# Patient Record
Sex: MALE | Race: WHITE | NOT HISPANIC OR LATINO | Employment: FULL TIME | URBAN - METROPOLITAN AREA
[De-identification: names, ages, dates, MRNs, and addresses within clinical notes are randomized per-mention and may not be internally consistent; named-entity substitution may affect disease eponyms.]

---

## 2019-07-10 ENCOUNTER — OFFICE VISIT (OUTPATIENT)
Dept: URGENT CARE | Facility: CLINIC | Age: 39
End: 2019-07-10
Payer: COMMERCIAL

## 2019-07-10 VITALS
SYSTOLIC BLOOD PRESSURE: 130 MMHG | WEIGHT: 180 LBS | DIASTOLIC BLOOD PRESSURE: 77 MMHG | BODY MASS INDEX: 25.77 KG/M2 | HEIGHT: 70 IN | HEART RATE: 89 BPM

## 2019-07-10 DIAGNOSIS — M25.462 SWELLING OF KNEE JOINT, LEFT: ICD-10-CM

## 2019-07-10 DIAGNOSIS — L03.116 CELLULITIS OF LEFT KNEE: Primary | ICD-10-CM

## 2019-07-10 PROCEDURE — 3008F PR BODY MASS INDEX (BMI) DOCUMENTED: ICD-10-PCS | Mod: CPTII,S$GLB,, | Performed by: NURSE PRACTITIONER

## 2019-07-10 PROCEDURE — 73564 X-RAY EXAM KNEE 4 OR MORE: CPT | Mod: LT,S$GLB,, | Performed by: NURSE PRACTITIONER

## 2019-07-10 PROCEDURE — 99204 PR OFFICE/OUTPT VISIT, NEW, LEVL IV, 45-59 MIN: ICD-10-PCS | Mod: 25,S$GLB,, | Performed by: NURSE PRACTITIONER

## 2019-07-10 PROCEDURE — 99204 OFFICE O/P NEW MOD 45 MIN: CPT | Mod: 25,S$GLB,, | Performed by: NURSE PRACTITIONER

## 2019-07-10 PROCEDURE — 3008F BODY MASS INDEX DOCD: CPT | Mod: CPTII,S$GLB,, | Performed by: NURSE PRACTITIONER

## 2019-07-10 PROCEDURE — 73564 PR  X-RAY KNEE 4+ VIEW: ICD-10-PCS | Mod: LT,S$GLB,, | Performed by: NURSE PRACTITIONER

## 2019-07-10 RX ORDER — DICLOFENAC SODIUM 50 MG/1
50 TABLET, DELAYED RELEASE ORAL 3 TIMES DAILY PRN
Qty: 30 TABLET | Refills: 0 | Status: SHIPPED | OUTPATIENT
Start: 2019-07-10 | End: 2020-07-08 | Stop reason: ALTCHOICE

## 2019-07-10 RX ORDER — DEXTROAMPHETAMINE SACCHARATE, AMPHETAMINE ASPARTATE, DEXTROAMPHETAMINE SULFATE AND AMPHETAMINE SULFATE 7.5; 7.5; 7.5; 7.5 MG/1; MG/1; MG/1; MG/1
30 TABLET ORAL 2 TIMES DAILY
COMMUNITY

## 2019-07-10 RX ORDER — SULFAMETHOXAZOLE AND TRIMETHOPRIM 800; 160 MG/1; MG/1
2 TABLET ORAL 2 TIMES DAILY
Qty: 28 TABLET | Refills: 0 | Status: SHIPPED | OUTPATIENT
Start: 2019-07-10 | End: 2019-07-17

## 2019-07-11 NOTE — PROGRESS NOTES
"Subjective:       Patient ID: Flakito Soriano is a 38 y.o. male.    Vitals:  height is 5' 10" (1.778 m) and weight is 81.6 kg (180 lb). His blood pressure is 130/77 and his pulse is 89.     Chief Complaint: Joint Swelling (left knee x 3 days)    Knee Pain    The incident occurred 3 to 5 days ago. The pain is present in the left knee. The pain is at a severity of 7/10. Associated symptoms include an inability to bear weight. The symptoms are aggravated by weight bearing. He has tried non-weight bearing for the symptoms. The treatment provided no relief.       Constitution: Negative for chills, fatigue and fever.   HENT: Negative for congestion and sore throat.    Neck: Negative for painful lymph nodes.   Cardiovascular: Negative for chest pain and leg swelling.   Eyes: Negative for double vision and blurred vision.   Respiratory: Negative for cough and shortness of breath.    Gastrointestinal: Negative for nausea, vomiting and diarrhea.   Genitourinary: Negative for dysuria, frequency and urgency.   Musculoskeletal: Positive for pain, joint pain, joint swelling and pain with walking. Negative for trauma, muscle cramps and muscle ache.   Skin: Positive for erythema. Negative for color change, pale and rash.   Allergic/Immunologic: Negative for seasonal allergies.   Neurological: Negative for dizziness, history of vertigo, light-headedness, passing out and headaches.   Hematologic/Lymphatic: Negative for swollen lymph nodes, easy bruising/bleeding and history of blood clots. Does not bruise/bleed easily.   Psychiatric/Behavioral: Negative for nervous/anxious, sleep disturbance and depression. The patient is not nervous/anxious.        Objective:      Physical Exam   Constitutional: He is oriented to person, place, and time. He appears well-developed and well-nourished. He is cooperative.  Non-toxic appearance. He does not appear ill. No distress.   HENT:   Head: Normocephalic and atraumatic.   Right Ear: Hearing, " tympanic membrane, external ear and ear canal normal.   Left Ear: Hearing, tympanic membrane, external ear and ear canal normal.   Nose: Nose normal. No mucosal edema, rhinorrhea or nasal deformity. No epistaxis. Right sinus exhibits no maxillary sinus tenderness and no frontal sinus tenderness. Left sinus exhibits no maxillary sinus tenderness and no frontal sinus tenderness.   Mouth/Throat: Uvula is midline, oropharynx is clear and moist and mucous membranes are normal. No trismus in the jaw. Normal dentition. No uvula swelling. No posterior oropharyngeal erythema.   Eyes: Conjunctivae and lids are normal. Right eye exhibits no discharge. Left eye exhibits no discharge. No scleral icterus.   Sclera clear bilat   Neck: Trachea normal, normal range of motion, full passive range of motion without pain and phonation normal. Neck supple.   Cardiovascular: Normal rate, regular rhythm, normal heart sounds, intact distal pulses and normal pulses.   Pulmonary/Chest: Effort normal and breath sounds normal. No respiratory distress.   Abdominal: Soft. Normal appearance and bowel sounds are normal. He exhibits no distension, no pulsatile midline mass and no mass. There is no tenderness.   Musculoskeletal: He exhibits no edema or deformity.        Left knee: He exhibits decreased range of motion, swelling, effusion and erythema. He exhibits no ecchymosis, no deformity, no laceration, normal alignment, no LCL laxity, normal patellar mobility, no bony tenderness, normal meniscus and no MCL laxity. Tenderness found. Patellar tendon tenderness noted. No medial joint line, no lateral joint line, no MCL and no LCL tenderness noted.        Legs:  Neurological: He is alert and oriented to person, place, and time. He exhibits normal muscle tone. Coordination normal.   Skin: Skin is warm, dry and intact. He is not diaphoretic. There is erythema. No pallor.   Psychiatric: He has a normal mood and affect. His speech is normal and behavior  is normal. Judgment and thought content normal. Cognition and memory are normal.   Nursing note and vitals reviewed.      Assessment:       1. Cellulitis of left knee    2. Swelling of knee joint, left        Plan:         Cellulitis of left knee    Swelling of knee joint, left  -     XR KNEE 3 VIEW LEFT; Future; Expected date: 07/10/2019    Other orders  -     sulfamethoxazole-trimethoprim 800-160mg (BACTRIM DS) 800-160 mg Tab; Take 2 tablets by mouth 2 (two) times daily. for 7 days  Dispense: 28 tablet; Refill: 0  -     diclofenac (VOLTAREN) 50 MG EC tablet; Take 1 tablet (50 mg total) by mouth 3 (three) times daily as needed.  Dispense: 30 tablet; Refill: 0

## 2019-07-11 NOTE — PATIENT INSTRUCTIONS
Cellulitis (Child)  Cellulitis is an infection of the deep layers of skin. A break in the skin, such as a cut or scratch, can let bacteria under the skin. If the bacteria get to deep layers of the skin, it can case a serious infection. If not treated, cellulitis can get into the bloodstream and lymph nodes. The infection can then spread throughout the body.  In children, cellulitis occurs most often on the legs and feet. It is more common in children with a weakened immune system. Cellulitis causes the affected skin to become red, swollen, warm, and sore. The reddened areas have a visible border. Your child may have a fever, chills, and pain. A young child may be fussy and cry and be hard to soothe.  Cellulitis is treated with antibiotics. Symptoms should get better 1 to 2 days after treatment is started. In some cases, symptoms can come back.  Home care  You will be given an antibiotic to treat the infection. Make sure to give all the medicine for the full number of days until it is gone. Keep giving the medicine even if your child has no symptoms. You may also be advised to use medicine to reduce fever and swelling. Follow the healthcare providers instructions for giving these medicines to your child.  General care  · Have your child rest as much as possible until the infection starts to get better.  · If possible, have your child sit or lie down with the affected area raised above the level of his or her heart. This can help reduce swelling.  · Follow the healthcare providers instructions to care for an open wound and change any dressings.  · Keep your childs fingernails short to reduce scratching.  · Wash your hands with soap and warm water before and after caring for your child. This is to prevent spreading the infection.  Follow-up care  Follow up with your childs healthcare provider.  When to seek medical advice  Call your child's healthcare provider right away if any of these occur:  · Fever of 100.4º  F (38.0º C) or higher orally, or over 101.4º F (38.6 C) rectally, after 2 days on antibiotics  · Symptoms that dont get better with treatment  · Swollen lymph nodes on the neck or under the arm  · Swelling around the eyes or behind the ears  · Excessive drooling, neck swelling, or muffled voice  · Redness or swelling that gets worse  · Pain that gets worse  · Foul-smelling fluid coming from the affected area  · Blackened skin  Date Last Reviewed: 1/1/2017  © 9225-3028 [employer]. [address] [place] [address]. All rights reserved. This information is not intended as a substitute for professional medical care. Always follow your healthcare professional's instructions.

## 2020-06-28 NOTE — PROGRESS NOTES
"Subjective:      Flakito Barajas is a 39 y.o. male who was self-referred for evaluation of prostatitis.      Mr. Barajas presents to discuss recent diagnosis of prostatitis.  He presented to ED on 6/21 with low back pain that radiated to bilateral hips. He reports onset was gradual in nature but exacerbated by golfing earlier that day. He was treated with morphine and sent home. He had follow up appt with PCP. Based on symptoms he was started on 15 day course of sullfamethoxazole-trimethoprim 800-160mg (BACTRIM DS) 800-160 mg Tab; instructed to follow up with Urology. AUASS score today is 15/3.  He reports lower back pain that radiates to hips, bilaterally, difficulty sitting in certain positions ("feels like he is sitting on something"), dysuria, urgency and frequency. He also reports onset of weak stream this past week. Reports fever for 3 days last week. The highest was 103. He currently denies chills, nausea, vomiting. Reports some loss of appetite last week. Since starting Bactrim he has experienced some mild relief of symptoms. He reports that back pain has gotten a little better. No longer experiencing fever.    No personal or family history of prostate cancer. No previous history of  surgeries. No past history of UTI. 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 Xrays were conducted in ED. See below for full report.      The following portions of the patient's history were reviewed and updated as appropriate: allergies, current medications, past family history, past medical history, past social history, past surgical history and problem list.    Review of Systems  Constitutional: no fever or chills  ENT: no nasal congestion or sore throat  Respiratory: no cough or shortness of breath  Cardiovascular: no chest pain or palpitations  Gastrointestinal: no nausea or vomiting, tolerating diet  Genitourinary: as per HPI  Hematologic/Lymphatic: no easy bruising or lymphadenopathy  Musculoskeletal: " "no arthralgias or myalgias  Neurological: no seizures or tremors  Behavioral/Psych: no auditory or visual hallucinations     Objective:   Vitals: BP (!) 145/87 (BP Location: Left arm, Patient Position: Sitting, BP Method: Medium (Automatic))   Pulse (!) 130   Ht 5' 10" (1.778 m)   Wt 88.3 kg (194 lb 8.9 oz)   BMI 27.92 kg/m²     Physical Exam   General: alert and oriented, no acute distress  Head: normocephalic, atraumatic  Neck: supple, no lymphadenopathy, normal ROM, no masses  Respiratory: Symmetric expansion, non-labored breathing  Cardiovascular: regular rate and rhythm, nomal pulses, no peripheral edema  Abdomen: soft, non tender, non distended, no palpable masses, no hernias, no hepatomegaly or splenomegaly  Genitourinary: defer due to suspected prostatitis   Lymphatic: no inguinal nodes  Skin: normal coloration and turgor, no rashes, no suspicious skin lesions noted  Neuro: alert and oriented x3, no gross deficits  Psych: normal judgment and insight, normal mood/affect and non-anxious    Physical Exam    Lab Review   Urinalysis demonstrates positive for leukocytes, red blood cells, protein, urobilinogen  No results found for: WBC, HGB, HCT, MCV, PLT  No results found for: CREATININE, BUN  No results found for: PSA  Imaging  Pain, unspecified     TECHNIQUE:  Two or three views of the sacrum and coccyx were performed.     COMPARISON:  None     FINDINGS:  No acute fracture of the sacrum or coccyx.     Grade 1 spondylolisthesis of L5 on S1 is incidentally noted with mild disc space narrowing.     No osseous destruction.  No mass is detected.     Impression:     1. No acute radiographic abnormality.  2. Grade 1 spondylolisthesis of L5 on S1.        Electronically signed by: Isidoro Sosa  Date:                                            06/21/2020  Time:                                           17:42    Bladder Scan PVR: 15cc     Assessment:     1. Dysuria    2. Acute prostatitis        Plan:     Orders " Placed This Encounter    Urine culture    POCT URINE DIPSTICK WITHOUT MICROSCOPE    POCT Bladder Scan     --In agreement with dx of prostatitis; pt instructed to continue antibiotics are directed  --Will send urine today; notify of results by phone, adjust antibiotics as needed  --Pt is aware that notify office of worsening symptoms while on antibiotics such as fever; will need to r/o prostate abscess with CT.   --RTC in 2 weeks for symptoms assessment

## 2020-06-30 ENCOUNTER — OFFICE VISIT (OUTPATIENT)
Dept: UROLOGY | Facility: CLINIC | Age: 40
End: 2020-06-30
Payer: COMMERCIAL

## 2020-06-30 VITALS
SYSTOLIC BLOOD PRESSURE: 145 MMHG | WEIGHT: 194.56 LBS | HEIGHT: 70 IN | BODY MASS INDEX: 27.85 KG/M2 | DIASTOLIC BLOOD PRESSURE: 87 MMHG | HEART RATE: 130 BPM

## 2020-06-30 DIAGNOSIS — R30.0 DYSURIA: Primary | ICD-10-CM

## 2020-06-30 DIAGNOSIS — N41.0 ACUTE PROSTATITIS: ICD-10-CM

## 2020-06-30 LAB
BILIRUB SERPL-MCNC: ABNORMAL MG/DL
BLOOD URINE, POC: 50
CLARITY, POC UA: ABNORMAL
COLOR, POC UA: YELLOW
GLUCOSE UR QL STRIP: NORMAL
KETONES UR QL STRIP: NEGATIVE
LEUKOCYTE ESTERASE URINE, POC: ABNORMAL
NITRITE, POC UA: NEGATIVE
PH, POC UA: 5
POC RESIDUAL URINE VOLUME: 15 ML (ref 0–100)
PROTEIN, POC: ABNORMAL
SPECIFIC GRAVITY, POC UA: 1.02
UROBILINOGEN, POC UA: 8

## 2020-06-30 PROCEDURE — 87086 URINE CULTURE/COLONY COUNT: CPT

## 2020-06-30 PROCEDURE — 3008F PR BODY MASS INDEX (BMI) DOCUMENTED: ICD-10-PCS | Mod: CPTII,S$GLB,, | Performed by: NURSE PRACTITIONER

## 2020-06-30 PROCEDURE — 81002 URINALYSIS NONAUTO W/O SCOPE: CPT | Mod: S$GLB,,, | Performed by: NURSE PRACTITIONER

## 2020-06-30 PROCEDURE — 99203 OFFICE O/P NEW LOW 30 MIN: CPT | Mod: 25,S$GLB,, | Performed by: NURSE PRACTITIONER

## 2020-06-30 PROCEDURE — 51798 POCT BLADDER SCAN: ICD-10-PCS | Mod: S$GLB,,, | Performed by: NURSE PRACTITIONER

## 2020-06-30 PROCEDURE — 99999 PR PBB SHADOW E&M-EST. PATIENT-LVL III: CPT | Mod: PBBFAC,,, | Performed by: NURSE PRACTITIONER

## 2020-06-30 PROCEDURE — 81002 POCT URINE DIPSTICK WITHOUT MICROSCOPE: ICD-10-PCS | Mod: S$GLB,,, | Performed by: NURSE PRACTITIONER

## 2020-06-30 PROCEDURE — 99999 PR PBB SHADOW E&M-EST. PATIENT-LVL III: ICD-10-PCS | Mod: PBBFAC,,, | Performed by: NURSE PRACTITIONER

## 2020-06-30 PROCEDURE — 51798 US URINE CAPACITY MEASURE: CPT | Mod: S$GLB,,, | Performed by: NURSE PRACTITIONER

## 2020-06-30 PROCEDURE — 99203 PR OFFICE/OUTPT VISIT, NEW, LEVL III, 30-44 MIN: ICD-10-PCS | Mod: 25,S$GLB,, | Performed by: NURSE PRACTITIONER

## 2020-06-30 PROCEDURE — 3008F BODY MASS INDEX DOCD: CPT | Mod: CPTII,S$GLB,, | Performed by: NURSE PRACTITIONER

## 2020-06-30 RX ORDER — SULFAMETHOXAZOLE AND TRIMETHOPRIM 800; 160 MG/1; MG/1
1 TABLET ORAL 2 TIMES DAILY
Status: ON HOLD | COMMUNITY
End: 2020-07-20 | Stop reason: HOSPADM

## 2020-06-30 NOTE — PATIENT INSTRUCTIONS
Bacterial Prostatitis  The prostate gland is part of the male reproductive system. The gland sits just below the bladder. It surrounds the urethra, the tube that carries urine and semen out of the body. Bacterial prostatitis is an infection of the prostate. It causes the prostate to become painful and swollen. The problem often comes on suddenly, and can make you very sick. But it can be treated.     With a healthy prostate, urine flows easily through the urethra.       With a swollen prostate, the urethra narrows. Its harder for urine to go through.      Causes and symptoms  Bacterial prostatitis is due to infection with bacteria (germs). This infection makes the prostate swell. This squeezes the urethra, narrowing or blocking it. Symptoms may be severe. They can include:  · Fever and chills  · Low back pain  · Having to urinate often  · Pain with urination  · A less forceful urine stream  · Need to strain to urinate  · Inability to urinate  Treatment  The infection is treated with antibiotics. Take all of the medicine until it's gone, even if you start to feel better. If you dont, the infection may come back and be harder to treat. Your healthcare provider may also suggest other care, such as resting and drinking more fluid. Closely follow any instructions you are given.  Chronic bacterial prostatitis  Prostatitis can turn into a chronic (ongoing) problem. In this case, the prostate stays swollen and inflamed despite treatment with antibiotics. One possible cause is repeated infections. Symptoms include pain and burning with urination and needing to urinate often. It may also cause lower abdomen or back pain. If you have this problem, your healthcare provider will discuss a treatment plan with you. Treatment usually consists of a 6 to 12 week course of antibiotics.   Date Last Reviewed: 1/1/2017  © 4423-6950 [employer]. [address] [place] [address], [place] [address]. All rights reserved. This  information is not intended as a substitute for professional medical care. Always follow your healthcare professional's instructions.

## 2020-07-02 LAB — BACTERIA UR CULT: NO GROWTH

## 2020-07-05 ENCOUNTER — PATIENT MESSAGE (OUTPATIENT)
Dept: UROLOGY | Facility: CLINIC | Age: 40
End: 2020-07-05

## 2020-07-07 ENCOUNTER — TELEPHONE (OUTPATIENT)
Dept: UROLOGY | Facility: CLINIC | Age: 40
End: 2020-07-07

## 2020-07-07 DIAGNOSIS — R10.30 LOWER ABDOMINAL PAIN: ICD-10-CM

## 2020-07-07 DIAGNOSIS — Z30.09 ENCOUNTER FOR VASECTOMY ASSESSMENT: Primary | ICD-10-CM

## 2020-07-07 NOTE — TELEPHONE ENCOUNTER
----- Message from Lissy Kothari sent at 7/7/2020  3:24 PM CDT -----  Contact: self @ [phone]  Pt is calling for his CT Scan results from today.  Pls call to discuss.

## 2020-07-07 NOTE — TELEPHONE ENCOUNTER
----- Message from Jewels White sent at 7/7/2020  4:16 PM CDT -----  Regarding: Pt  Reason: Pt returning missed call. Please call     Communication: [phone]

## 2020-07-07 NOTE — TELEPHONE ENCOUNTER
I have spoken with pt. All questions answered. See results note for full report of telephone call.

## 2020-07-08 ENCOUNTER — TELEPHONE (OUTPATIENT)
Dept: NEUROSURGERY | Facility: CLINIC | Age: 40
End: 2020-07-08

## 2020-07-08 ENCOUNTER — OFFICE VISIT (OUTPATIENT)
Dept: SPINE | Facility: CLINIC | Age: 40
End: 2020-07-08
Payer: COMMERCIAL

## 2020-07-08 VITALS
HEART RATE: 106 BPM | WEIGHT: 194.69 LBS | BODY MASS INDEX: 27.87 KG/M2 | TEMPERATURE: 98 F | HEIGHT: 70 IN | SYSTOLIC BLOOD PRESSURE: 127 MMHG | DIASTOLIC BLOOD PRESSURE: 83 MMHG

## 2020-07-08 DIAGNOSIS — M43.17 SPONDYLOLISTHESIS AT L5-S1 LEVEL: Primary | ICD-10-CM

## 2020-07-08 DIAGNOSIS — M54.9 DORSALGIA, UNSPECIFIED: ICD-10-CM

## 2020-07-08 DIAGNOSIS — M43.10 PARS DEFECT WITH SPONDYLOLISTHESIS: ICD-10-CM

## 2020-07-08 DIAGNOSIS — M48.061 LUMBAR STENOSIS WITHOUT NEUROGENIC CLAUDICATION: ICD-10-CM

## 2020-07-08 PROCEDURE — 99999 PR PBB SHADOW E&M-EST. PATIENT-LVL IV: CPT | Mod: PBBFAC,,, | Performed by: PHYSICIAN ASSISTANT

## 2020-07-08 PROCEDURE — 99204 PR OFFICE/OUTPT VISIT, NEW, LEVL IV, 45-59 MIN: ICD-10-PCS | Mod: S$GLB,,, | Performed by: PHYSICIAN ASSISTANT

## 2020-07-08 PROCEDURE — 3008F BODY MASS INDEX DOCD: CPT | Mod: CPTII,S$GLB,, | Performed by: PHYSICIAN ASSISTANT

## 2020-07-08 PROCEDURE — 3008F PR BODY MASS INDEX (BMI) DOCUMENTED: ICD-10-PCS | Mod: CPTII,S$GLB,, | Performed by: PHYSICIAN ASSISTANT

## 2020-07-08 PROCEDURE — 99999 PR PBB SHADOW E&M-EST. PATIENT-LVL IV: ICD-10-PCS | Mod: PBBFAC,,, | Performed by: PHYSICIAN ASSISTANT

## 2020-07-08 PROCEDURE — 99204 OFFICE O/P NEW MOD 45 MIN: CPT | Mod: S$GLB,,, | Performed by: PHYSICIAN ASSISTANT

## 2020-07-08 RX ORDER — TIZANIDINE 4 MG/1
4 TABLET ORAL EVERY 8 HOURS PRN
Qty: 30 TABLET | Refills: 0 | Status: ON HOLD | OUTPATIENT
Start: 2020-07-08 | End: 2020-07-20 | Stop reason: HOSPADM

## 2020-07-08 RX ORDER — NAPROXEN 500 MG/1
500 TABLET ORAL 2 TIMES DAILY PRN
Qty: 60 TABLET | Refills: 0 | Status: ON HOLD | OUTPATIENT
Start: 2020-07-08 | End: 2020-07-20 | Stop reason: HOSPADM

## 2020-07-08 NOTE — TELEPHONE ENCOUNTER
----- Message from Jeannette Ware PA-C sent at 7/8/2020  4:03 PM CDT -----  Regarding: Appt  Hey There,    Can you please make this patient an appt early next week with 1st available? MRI should be done this week. He probably needs an L5-S1 fusion as he has a spondy with B pars defect. May even need an ALIF?     Thanks,   K

## 2020-07-08 NOTE — PROGRESS NOTES
[facility]    Patient ID: Flakito Barajas is a 39 y.o. male.    Chief Complaint   Patient presents with    Low-back Pain       Review of Systems   Constitutional: Negative for chills, fatigue and fever.   HENT: Negative for drooling, ear pain, hearing loss and trouble swallowing.    Eyes: Negative for photophobia and visual disturbance.   Respiratory: Negative for chest tightness and shortness of breath.    Cardiovascular: Negative for chest pain and leg swelling.   Gastrointestinal: Negative for abdominal pain, nausea and vomiting.   Endocrine: Negative for cold intolerance and heat intolerance.   Genitourinary: Negative for dysuria, frequency, hematuria and urgency.   Musculoskeletal: Positive for back pain and gait problem. Negative for arthralgias, myalgias, neck pain and neck stiffness.   Skin: Negative for color change and wound.   Allergic/Immunologic: Negative for immunocompromised state.   Neurological: Negative for seizures, syncope, facial asymmetry, speech difficulty, weakness, numbness and headaches.   Psychiatric/Behavioral: Negative for agitation, confusion, hallucinations and sleep disturbance.       No past medical history on file.  No past surgical history on file.  Social History     Socioeconomic History    Marital status:      Spouse name: Not on file    Number of children: Not on file    Years of education: Not on file    Highest education level: Not on file   Occupational History    Not on file   Social Needs    Financial resource strain: Not on file    Food insecurity     Worry: Not on file     Inability: Not on file    Transportation needs     Medical: Not on file     Non-medical: Not on file   Tobacco Use    Smoking status: Never Smoker    Smokeless tobacco: Never Used   Substance and Sexual Activity    Alcohol use: Not on file    Drug use: Not on file    Sexual activity: Not on file   Lifestyle    Physical activity     Days per week: Not on file  "    Minutes per session: Not on file    Stress: Not on file   Relationships    Social connections     Talks on phone: Not on file     Gets together: Not on file     Attends [employer] service: Not on file     Active member of club or organization: Not on file     Attends meetings of clubs or organizations: Not on file     Relationship status: Not on file   Other Topics Concern    Not on file   Social History Narrative    Not on file     No family history on file.  Review of patient's allergies indicates:  No Known Allergies    Current Outpatient Medications:     dextroamphetamine-amphetamine (ADDERALL) 30 mg Tab, Take 30 tablets by mouth., Disp: , Rfl:     lidocaine (LIDODERM) 5 %, Place 1 patch onto the skin once daily. Remove & Discard patch within 12 hours or as directed by MD, Disp: 30 patch, Rfl: 0    naproxen (NAPROSYN) 500 MG tablet, Take 1 tablet (500 mg total) by mouth 2 (two) times daily as needed (pain. take with food)., Disp: 60 tablet, Rfl: 0    sulfamethoxazole-trimethoprim 800-160mg (BACTRIM DS) 800-160 mg Tab, Take 1 tablet by mouth 2 (two) times a day., Disp: , Rfl:     tiZANidine (ZANAFLEX) 4 MG tablet, Take 1 tablet (4 mg total) by mouth every 8 (eight) hours as needed., Disp: 30 tablet, Rfl: 0    Vitals:    07/08/20 1515   BP: 127/83   Pulse: 106   Temp: 98.3 °F (36.8 °C)   Weight: 88.3 kg (194 lb 10.7 oz)   Height: 5' 10" (1.778 m)   PainSc:   5   PainLoc: Back      Estimated body mass index is 27.93 kg/m² as calculated from the following:    Height as of this encounter: 5' 10" (1.778 m).    Weight as of this encounter: 88.3 kg (194 lb 10.7 oz).    Physical Exam  Vitals signs and nursing note reviewed.   Constitutional:       Appearance: Normal appearance. He is well-developed.   HENT:      Head: Normocephalic and atraumatic.      Right Ear: Hearing normal.      Left Ear: Hearing normal.      Nose: Nose normal.      Mouth/Throat:      Pharynx: Uvula midline.   Eyes:      General: Lids " are normal.      Extraocular Movements: EOM normal.      Conjunctiva/sclera: Conjunctivae normal.      Pupils: Pupils are equal, round, and reactive to light.   Neck:      Musculoskeletal: Full passive range of motion without pain, normal range of motion and neck supple.      Trachea: Trachea and phonation normal.   Cardiovascular:      Rate and Rhythm: Normal rate and regular rhythm.      Pulses: Normal pulses.   Pulmonary:      Effort: Pulmonary effort is normal.      Breath sounds: Normal breath sounds.   Abdominal:      Palpations: Abdomen is soft.   Musculoskeletal: Normal range of motion.   Feet:      Right foot:      Protective Sensation: 4 sites tested. 4 sites sensed.      Skin integrity: No ulcer.      Left foot:      Protective Sensation: 4 sites tested. 4 sites sensed.      Skin integrity: No ulcer.   Skin:     General: Skin is warm and dry.      Capillary Refill: Capillary refill takes less than 2 seconds.   Neurological:      Mental Status: He is alert and oriented to person, place, and time.      Cranial Nerves: No cranial nerve deficit.      Sensory: No sensory deficit.      Coordination: Finger-Nose-Finger Test, Heel to Shin Test and Romberg Test normal.      Gait: Gait is intact. Tandem walk normal.      Deep Tendon Reflexes: Strength normal and reflexes are normal and symmetric.      Reflex Scores:       Tricep reflexes are 2+ on the right side and 2+ on the left side.       Bicep reflexes are 2+ on the right side and 2+ on the left side.       Brachioradialis reflexes are 2+ on the right side and 2+ on the left side.       Patellar reflexes are 2+ on the right side and 2+ on the left side.       Achilles reflexes are 2+ on the right side and 2+ on the left side.  Psychiatric:         Speech: Speech normal.         Behavior: Behavior normal.         Thought Content: Thought content normal.         Judgment: Judgment normal.         Neurologic Exam     Mental Status   Oriented to person, place, and  time.   Follows 3 step commands.   Attention: normal. Concentration: normal.   Speech: speech is normal   Level of consciousness: alert  Knowledge: good.   Able to name object.     Cranial Nerves     CN II   Visual fields full to confrontation.   Visual acuity: normal  Right visual field deficit: none  Left visual field deficit: none     CN III, IV, VI   Pupils are equal, round, and reactive to light.  Extraocular motions are normal.   CN III: no CN III palsy  CN VI: no CN VI palsy    CN V   Facial sensation intact.   Right facial sensation deficit: none  Left facial sensation deficit: none    CN VII   Facial expression full, symmetric.   Right facial weakness: none  Left facial weakness: none    CN VIII   CN VIII normal.   Hearing: intact    CN IX, X   CN IX normal.   CN X normal.     CN XI   CN XI normal.     CN XII   CN XII normal.     Motor Exam   Muscle bulk: normal  Overall muscle tone: normal  Right arm tone: normal  Left arm tone: normal  Right arm pronator drift: absent  Left arm pronator drift: absent  Right leg tone: normal  Left leg tone: normal    Strength   Strength 5/5 throughout.   Right neck flexion: 5/5  Left neck flexion: 5/5  Right neck extension: 5/5  Left neck extension: 5/5  Right deltoid: 5/5  Left deltoid: 5/5  Right biceps: 5/5  Left biceps: 5/5  Right triceps: 5/5  Left triceps: 5/5  Right wrist flexion: 5/5  Left wrist flexion: 5/5  Right wrist extension: 5/5  Left wrist extension: 5/5  Right interossei: 5/5  Left interossei: 5/5  Right abdominals: 5/5  Left abdominals: 5/5  Right iliopsoas: 5/5  Left iliopsoas: 5/5  Right quadriceps: 5/5  Left quadriceps: 5/5  Right hamstrin/5  Left hamstrin/5  Right glutei: 5/5  Left glutei: 5/5  Right anterior tibial: 5/5  Left anterior tibial: 5/5  Right posterior tibial: 5/5  Left posterior tibial: 5/5  Right peroneal: 5/5  Left peroneal: 5/5  Right gastroc: 5/5  Left gastroc: 5/5    Sensory Exam   Light touch normal.   Right arm light touch:  normal  Left arm light touch: normal  Right leg light touch: normal  Left leg light touch: normal  Vibration normal.     Gait, Coordination, and Reflexes     Gait  Gait: normal    Coordination   Romberg: negative  Finger to nose coordination: normal  Heel to shin coordination: normal  Tandem walking coordination: normal    Tremor   Resting tremor: absent  Intention tremor: absent  Action tremor: absent    Reflexes   Right brachioradialis: 2+  Left brachioradialis: 2+  Right biceps: 2+  Left biceps: 2+  Right triceps: 2+  Left triceps: 2+  Right patellar: 2+  Left patellar: 2+  Right achilles: 2+  Left achilles: 2+  Right : 2+  Left : 2+  Right plantar: normal  Left plantar: normal  Right Spivey: absent  Left Spivey: absent  Right ankle clonus: absent  Left ankle clonus: absent      CT Renal Stone Study ABD Pelvis WO  Narrative: EXAMINATION:  CT RENAL STONE STUDY ABD PELVIS WO    CLINICAL HISTORY:  Abdominal pain, fever; Lower abdominal pain, unspecified    TECHNIQUE:  Low dose axial images, sagittal and coronal reformations were obtained from the lung bases to the pubic symphysis.  Contrast was not administered.    COMPARISON:  None    FINDINGS:  Images of the lower thorax are remarkable for minimal dependent atelectasis noting motion artifact.  There is trace pericardial fluid.    There is a subcentimeter low attenuating lesion within the anterior aspect of the left hepatic lobe, too small for characterization.  The spleen, pancreas, gallbladder and adrenal glands have a grossly unremarkable noncontrast appearance allowing for motion artifact.  The stomach is distended with ingested content noting small hiatal hernia.  No significant abdominal lymphadenopathy.  The pancreatic duct is not dilated.  No ascites.    The kidneys have a grossly unremarkable noncontrast appearance without hydronephrosis or nephrolithiasis.  The bilateral ureters are unremarkable without calculi seen.  The urinary bladder is  unremarkable.  The prostate is not enlarged.    There are a few scattered colonic diverticula without inflammation.  The terminal ileum and appendix are unremarkable.  The small bowel is unremarkable.  There are several scattered shotty periaortic and paracaval lymph nodes.  No focal organized pelvic fluid collection.  There are minimal bilateral fat containing inguinal hernias.    Degenerative changes are noted of the spine.  There is grade 2 anterolisthesis of L5 on S1 related to bilateral L5 pars defects.  There is edema/induration anterior to the sacrum at the level of S1-S2.  There is a large anterior disc herniation at L5-S1.  Given the inflammation, developing infection at this level is not excluded, correlation with any focal tenderness in the region is recommended, further evaluation with MRI as warranted.  Impression: This report was flagged in Epic as abnormal.    1. No findings to suggest obstructive uropathy.  2. Degenerative changes at L5-S1 noting grade 2 anterolisthesis of L5 on S1 related to pars defects.  There is a large anterior disc protrusion at L5-S1 with associated inflammatory changes primarily anterior to S1 and S2.  Correlation with any focal tenderness in the region is recommended, further evaluation with MRI as warranted noting developing osteomyelitis/discitis is a concern particularly in this patient with provided history of abdominal pain and fever.  3. Please see above for several additional findings.    Electronically signed by: Florencio Kuhn MD  Date:    07/07/2020  Time:    12:23      Visit Diagnosis:  Spondylolisthesis at L5-S1 level  -     Ambulatory referral/consult to Neurosurgery; Future; Expected date: 07/15/2020    Dorsalgia, unspecified  -     MRI Lumbar Spine Without Contrast; Future; Expected date: 07/08/2020  -     X-Ray Lumbar Complete With Flex And Ext; Future; Expected date: 07/08/2020    Lumbar stenosis without neurogenic claudication  -     Ambulatory  referral/consult to Neurosurgery; Future; Expected date: 07/15/2020    Pars defect with spondylolisthesis    Other orders  -     tiZANidine (ZANAFLEX) 4 MG tablet; Take 1 tablet (4 mg total) by mouth every 8 (eight) hours as needed.  Dispense: 30 tablet; Refill: 0  -     naproxen (NAPROSYN) 500 MG tablet; Take 1 tablet (500 mg total) by mouth 2 (two) times daily as needed (pain. take with food).  Dispense: 60 tablet; Refill: 0        Provider dictation:  Oswestry score: 58%  PHQ:  8    The patient is a 39-year-old male with a negative past medical history that presents today for evaluation of severe axial lumbar back pain.  He reports he had to go to the emergency room due to severe pain.  He has had difficulties ambulating for the last several weeks.  He does have a remote history of a severe back injury when he was 8 playing football but he does not remember any other trauma.  Approximately 2-3 weeks ago he was having severe back pain with fever and was worked up for prostatitis however he is finished antibiotics and is no longer running fever.  Pain is in the axial spine radiates across the bilateral hips.  He is having difficulty doing activities of daily living due to severe back pain.  He denies leg numbness tingling or weakness.  He denies bowel or bladder dysfunction.  He has not had any type of intervention previously.    On physical examination he walks with an antalgic gait and slow due to back pain however muscle stretch reflexes and strength is maintained.  No sensory deficit.  EHL is intact bilaterally.    CT of the abdomen was independently reviewed there is a grade 2 spondylolisthesis of L5 on S1 with severe bilateral foraminal stenosis and disc herniation.  There is a bilateral pars defect without cortical bridging.    I am concerned at the spondylolisthesis and pars defects that he may develop progressive neurologic symptoms.  I have recommended an MRI of the lumbar spine evaluation with neuro  surgery.  But he has not tried conservative management his debilitating lumbar back pain is likely not going to improve with physical therapy or injections due to the size of the spondylolisthesis.  I will defer to neurosurgery for evaluation of surgical intervention verses physical therapy versus injections at this time.  The patient is interested in consultation with Neurosurgery.  Follow-up with me p.r.n.        This note was done using voice recognition software. Please excuse any errors missed in proof reading.

## 2020-07-08 NOTE — PROGRESS NOTES
Hello! We are asking you to complete following questionnaire prior to your upcoming virtual visit with Mariana Calixto. This questionnaire has been designed to give us information on how your low back or leg pain has affected your ability to manage everyday life. Please respond with only ONE number answer to each question which best applies to you TODAY. This will need to be completed and sent back prior to checking in for your appointment.    EXAMPLE OF RESPONSE:  Q1: 2       Q2: 1      Q3: 4     Q1: Pain Intensity 3  0 - No pain  1 - Very Mild pain   2 - Moderate Pain  3 - Fairly Severe Pain  4 - Very Severe Pain  5 - Worst Imaginable Pain    Q2: Personal Care (washing, dressing, etc) 1  0 - I can look after myself normally without causing extra pain  1 - I can look after myself normally but it causes extra pain  2 - It is painful to look after myself and I am slow and careful  3 - I need some help but manage most of my personal care   4 - I need help everyday in most aspects of self-care   5 - I do not get dressed, I wash with difficulty and stay in bed    Q3: Lifting 5  0 - I can lift heavy weights without extra pain  1 - I can lift heavy weights but it gives extra pain   2 - Pain prevents me from lifting heavy weights off the floor, but I can manage if they are conveniently placed (eg. on a table)  3 - Pain prevents me from lifting heavy weights, but I can manage light to medium weights if they are conveniently positioned   4 - I can lift very light weights   5 - I cannot lift or carry anything at all     Q4: Walking 3  0 - Pain does not prevent me walking any distance   1 - Pain prevents me from walking more than 1 mile  2 - Pain prevents me from walking more than 1/2 mile  3 - Pain prevents me from walking more than 100 yards  4 - I can only walk using a stick or crutches  5 - I am in bed most of the time    Q5: Sitting 3  0 - I can sit in any chair as long as I like   1 - I can only sit in my favorite chair  as long as I like   2 - Pain prevents me from sitting for more than one hour   3 - Pain prevents me from sitting for more than 30 minutes   4 - Pain prevents me from sitting for more than 10 minutes   5 - Pain prevents me from sitting at all     Q6: Standing 3  0 - I can stand as long as I want without extra pain  1 - I can stand as long as I want but it gives me extra pain  2 - Pain prevents me from standing for more than 1 hour   3 - Pain prevents me from standing for more than 30 minutes   4 - Pain prevents me from standing for more than 10 minutes   5 - Pain prevents me from standing at all     Q7: Sleeping 1   0 - My sleep is never disturbed by pain   1 - My sleep is occasionally disturbed by pain   2 - Because of pain, I have less than 6 hours of sleep  3 - Because of pain, I have less than 4 hours of sleep  4 - Because of pain, I have less than 2 hours of sleep  5 - Pain prevents me from sleeping at all    Q8: Sex Life (if applicable) 4  0 - My sex life is normal and causes no extra pain  1 - My sex life is normal but causes some extra pain   2 - My sex life is nearly normal but is very painful   3 - My sex life is severely restricted by pain  4 - My sex life is nearly absent because of pain  5 - Pain prevents any sex life at all    Q9: Social Life 4  0 - My social life is normal and gives me no extra pain  1 - My social life is normal but increases the degree of pain  2 - Pain has no significant effect on my social life apart from limiting my more energetic interests such as sports   3 - Pain has restricted my social life and I do not go out as often   4 - Pain has restricted my social life to my house  5 - I have no social life because of pain    Q10: Traveling 2  0 - I can travel anywhere without pain  1 - I can travel anywhere but it gives me extra pain   2 - Pain is bad but I manage journeys over 2 hours   3 - Pain restricts me to journeys of less than 1 hour  4 - Pain restricts me to short necessary  journeys under 30 minutes   5 - Pain prevents me from traveling except to receive treatment

## 2020-07-13 ENCOUNTER — OFFICE VISIT (OUTPATIENT)
Dept: NEUROSURGERY | Facility: CLINIC | Age: 40
End: 2020-07-13
Payer: COMMERCIAL

## 2020-07-13 ENCOUNTER — LAB VISIT (OUTPATIENT)
Dept: LAB | Facility: HOSPITAL | Age: 40
End: 2020-07-13
Attending: NEUROLOGICAL SURGERY
Payer: COMMERCIAL

## 2020-07-13 VITALS
BODY MASS INDEX: 27.87 KG/M2 | WEIGHT: 194.69 LBS | SYSTOLIC BLOOD PRESSURE: 122 MMHG | TEMPERATURE: 98 F | HEIGHT: 70 IN | DIASTOLIC BLOOD PRESSURE: 68 MMHG

## 2020-07-13 DIAGNOSIS — M54.10 RADICULOPATHY, UNSPECIFIED SPINAL REGION: Primary | ICD-10-CM

## 2020-07-13 DIAGNOSIS — M48.061 LUMBAR STENOSIS WITHOUT NEUROGENIC CLAUDICATION: ICD-10-CM

## 2020-07-13 DIAGNOSIS — M43.17 SPONDYLOLISTHESIS AT L5-S1 LEVEL: ICD-10-CM

## 2020-07-13 DIAGNOSIS — R50.9 FEVER, UNSPECIFIED FEVER CAUSE: ICD-10-CM

## 2020-07-13 DIAGNOSIS — M43.16 SPONDYLOLISTHESIS OF LUMBAR REGION: ICD-10-CM

## 2020-07-13 LAB — ERYTHROCYTE [SEDIMENTATION RATE] IN BLOOD BY WESTERGREN METHOD: 115 MM/HR (ref 0–10)

## 2020-07-13 PROCEDURE — 99245 PR OFFICE CONSULTATION,LEVEL V: ICD-10-PCS | Mod: S$GLB,,, | Performed by: NEUROLOGICAL SURGERY

## 2020-07-13 PROCEDURE — 86140 C-REACTIVE PROTEIN: CPT

## 2020-07-13 PROCEDURE — 85025 COMPLETE CBC W/AUTO DIFF WBC: CPT

## 2020-07-13 PROCEDURE — 99999 PR PBB SHADOW E&M-EST. PATIENT-LVL IV: ICD-10-PCS | Mod: PBBFAC,,, | Performed by: NEUROLOGICAL SURGERY

## 2020-07-13 PROCEDURE — 36415 COLL VENOUS BLD VENIPUNCTURE: CPT | Mod: PO

## 2020-07-13 PROCEDURE — 84145 PROCALCITONIN (PCT): CPT

## 2020-07-13 PROCEDURE — 99999 PR PBB SHADOW E&M-EST. PATIENT-LVL IV: CPT | Mod: PBBFAC,,, | Performed by: NEUROLOGICAL SURGERY

## 2020-07-13 PROCEDURE — 85651 RBC SED RATE NONAUTOMATED: CPT | Mod: PO

## 2020-07-13 PROCEDURE — 99245 OFF/OP CONSLTJ NEW/EST HI 55: CPT | Mod: S$GLB,,, | Performed by: NEUROLOGICAL SURGERY

## 2020-07-13 RX ORDER — GABAPENTIN 300 MG/1
300 CAPSULE ORAL 3 TIMES DAILY
Qty: 90 CAPSULE | Refills: 1 | Status: SHIPPED | OUTPATIENT
Start: 2020-07-13 | End: 2020-08-18 | Stop reason: ALTCHOICE

## 2020-07-13 NOTE — LETTER
July 29, 2020      Jeannette Ware PA-C  [address] [facility] [address]           [facility]  [address] [place] [address]  Phone: [phone]  Fax: [phone]          Patient: Flakito Barajas   MR Number: [id number]   YOB: 1980   Date of Visit: 7/13/2020       Dear Jeannette Ware:    Thank you for referring Flakito Barajas to me for evaluation. Attached you will find relevant portions of my assessment and plan of care.    If you have questions, please do not hesitate to call me. I look forward to following Flakito Barajas along with you.    Sincerely,    Ronen Davis MD    Enclosure  CC:  No Recipients    If you would like to receive this communication electronically, please contact [email or web address] or [phone] to request more information on [employer] access.    For providers and/or their staff who would like to refer a patient to [facility], please contact us through our one-stop-shop provider referral line, [facility], at [phone].    If you feel you have received this communication in error or would no longer like to receive these types of communications, please e-mail [email or web address]

## 2020-07-13 NOTE — PROGRESS NOTES
I have seen the patient, reviewed the Advanced Practice Provider's history and physical, assessment and plan. I have personally interviewed and examined the patient at bedside and interpreted the relevant imaging and lab work and I agree with the findings. I personally performed the documented services. See below for any additional comments.    Severe axial low back pain for 3.5 weeks following golfing. This was initially associated with fever up to 102.9 deg and night sweats for 3 days. The fever resolved with a course of Bactrim. No H/O IVDU, immunosuppression or other current infections. Pain is mostly axial with some right buttock pain and occasional bilateral big toe numbness. Pain initially improved but is now worsening again. H/O football-related low back injury as a child. Chronic LBP for 10 years.     Exam is significant for mild right EHL weakness.     CT of the abdomen from this month shows bilateral L5 pars defects and grade 2 L5-S1 spondylolisthesis.  MRI of the lumbar spine without contrast from this month shows severe bilateral L5-S1 foraminal stenosis associated with grade 2 spondylolisthesis.  Dynamic x-rays from this month show stable grade 2 spondylolisthesis however, standing x-rays from last month showed only a grade 1 spondylolisthesis with approximately 6 mm of additional anterolisthesis, for a total of 18 mm on the more recent study indicating rapid progression of the spondylolisthesis.  There is T2 signal in the L5-S1 disc space as well as in the epidural space dorsal to the S1 vertebral body which may represent edema or hematoma but infection is in the differential.    Given the rapid progression of his L5-S1 spondylolisthesis, indicating instability, and his intractable symptoms, he is a candidate for surgery.  I have discussed indirect bilateral foraminal decompression and stabilization L5-S1 via a right minimally invasive L5-S1 transforaminal lumbar interbody fusion with associated L5-S1  posterior instrumented fusion.  Because his radicular symptoms are relatively mild and of short duration at this point, a trial of conservative management is also a reasonable option.  This would be contingent upon ruling out infection. Given the associated fever and epidural and disc space signal on non contrast MRI, I will order infectious markers and repeat MRI with/without contrast to rule out infection.  If the above is negative, he would be a candidate for a trial of epidural steroid injection.  He is requesting to proceed with conservative treatment at this point.    Assuming the infectious workup is negative, we will have him follow-up after epidural steroid injection.  He knows to seek medical attention if his symptoms worsen or if he develops progressive weakness or bowel or bladder dysfunction.  I have ordered Neurontin to help with his radicular symptoms.  He has completed a Medrol Dosepak.

## 2020-07-13 NOTE — PROGRESS NOTES
Neurosurgery History & Physical    Patient ID: Flakito Barajas is a 39 y.o. male.    Chief Complaint   Patient presents with    Lumbar Spine Pain (L-Spine)     LBP for years. 3 1/2 weeks after playing gold he has had a worsening of pain. Pain is located mid low back and denies any radiating pain to his legs. Pain is aggrevated by walking, sitting. Alleviated slightly with laying down and NSAIDS. Denies bladder or bowel dysfunction. Denies RICKI or PT..       Review of Systems   Constitutional: Negative for chills, diaphoresis, fatigue and fever.   HENT: Negative for congestion, hearing loss, rhinorrhea and sore throat.    Eyes: Negative for photophobia, pain, redness and visual disturbance.   Respiratory: Negative for cough, chest tightness, shortness of breath and wheezing.    Cardiovascular: Negative for chest pain, palpitations and leg swelling.   Gastrointestinal: Negative for abdominal distention, abdominal pain, constipation, diarrhea, nausea and vomiting.   Genitourinary: Negative for difficulty urinating, dysuria, frequency, hematuria and urgency.   Musculoskeletal: Positive for back pain. Negative for gait problem, myalgias, neck pain and neck stiffness.   Skin: Negative for pallor and rash.   Neurological: Positive for numbness. Negative for dizziness, seizures, speech difficulty, weakness, light-headedness and headaches.   Psychiatric/Behavioral: Negative for confusion, hallucinations and sleep disturbance.       No past medical history on file.  Social History     Socioeconomic History    Marital status:      Spouse name: Not on file    Number of children: Not on file    Years of education: Not on file    Highest education level: Not on file   Occupational History    Not on file   Social Needs    Financial resource strain: Not on file    Food insecurity     Worry: Not on file     Inability: Not on file    Transportation needs     Medical: Not on file     Non-medical: Not on file   Tobacco  "Use    Smoking status: Never Smoker    Smokeless tobacco: Never Used   Substance and Sexual Activity    Alcohol use: Not on file    Drug use: Not on file    Sexual activity: Not on file   Lifestyle    Physical activity     Days per week: Not on file     Minutes per session: Not on file    Stress: Not on file   Relationships    Social connections     Talks on phone: Not on file     Gets together: Not on file     Attends [employer] service: Not on file     Active member of club or organization: Not on file     Attends meetings of clubs or organizations: Not on file     Relationship status: Not on file   Other Topics Concern    Not on file   Social History Narrative    Not on file     No family history on file.  Review of patient's allergies indicates:  No Known Allergies    Current Outpatient Medications:     dextroamphetamine-amphetamine (ADDERALL) 30 mg Tab, Take 30 tablets by mouth., Disp: , Rfl:     lidocaine (LIDODERM) 5 %, Place 1 patch onto the skin once daily. Remove & Discard patch within 12 hours or as directed by MD, Disp: 30 patch, Rfl: 0    naproxen (NAPROSYN) 500 MG tablet, Take 1 tablet (500 mg total) by mouth 2 (two) times daily as needed (pain. take with food)., Disp: 60 tablet, Rfl: 0    tiZANidine (ZANAFLEX) 4 MG tablet, Take 1 tablet (4 mg total) by mouth every 8 (eight) hours as needed., Disp: 30 tablet, Rfl: 0    sulfamethoxazole-trimethoprim 800-160mg (BACTRIM DS) 800-160 mg Tab, Take 1 tablet by mouth 2 (two) times a day., Disp: , Rfl:   Blood pressure 122/68, temperature 98.3 °F (36.8 °C), height 5' 10" (1.778 m), weight 88.3 kg (194 lb 10.7 oz).      Neurologic Exam     Mental Status   Oriented to person, place, and time.   Oriented to person.   Oriented to place.   Oriented to time.   Follows 3 step commands.   Attention: normal. Concentration: normal.   Speech: speech is normal   Level of consciousness: alert  Knowledge: consistent with education.   Able to name object. Able " to read. Able to repeat. Able to write. Normal comprehension.      Cranial Nerves      CN II   Visual acuity: normal  Right visual field deficit: none  Left visual field deficit: none      CN III, IV, VI   Pupils are equal, round, and reactive to light.  Right pupil: Size: 3 mm. Shape: regular. Reactivity: brisk. Consensual response: intact.   Left pupil: Size: 3 mm. Shape: regular. Reactivity: brisk. Consensual response: intact.   CN III: no CN III palsy  CN VI: no CN VI palsy  Nystagmus: none   Diplopia: none  Ophthalmoparesis: none  Conjugate gaze: present     CN V   Right facial sensation deficit: none  Left facial sensation deficit: none     CN VII   Right facial weakness: none  Left facial weakness: none     CN VIII   Hearing: intact     CN IX, X   CN IX normal.   CN X normal.      CN XI   Right sternocleidomastoid strength: normal  Left sternocleidomastoid strength: normal  Right trapezius strength: normal  Left trapezius strength: normal     CN XII   Fasciculations: absent  Tongue deviation: none     Motor Exam   Muscle bulk: normal  Overall muscle tone: normal  Right arm pronator drift: absent  Left arm pronator drift: absent     Strength   Right deltoid: 5/5  Left deltoid: 5/5  Right biceps: 5/5  Left biceps: 5/5  Right triceps: 5/5  Left triceps: 5/5  Right wrist flexion: 5/5  Left wrist flexion: 5/5  Right wrist extension: 5/5  Left wrist extension: 5/5  Right interossei: 5/5  Left interossei: 5/5  Right iliopsoas: 4+/5  Left iliopsoas: 5/5  Right quadriceps: 5/5  Left quadriceps: 5/5  Right hamstrin/5  Left hamstrin/5  Right anterior tibial: 5/5  Left anterior tibial: 5/5  Right posterior tibial: 5/5  Left posterior tibial: 5/5  Right peroneal: 5/5  Left peroneal: 5/5  Right gastroc: 5/5  Left gastroc: 5/5     Sensory Exam   Right arm light touch: normal  Left arm light touch: normal  Right leg light touch: normal  Left leg light touch: normal     Gait, Coordination, and Reflexes       Gait  Gait: antalgic     Coordination   Romberg: negative  Finger to nose coordination: normal  Heel to shin coordination: normal  Tandem walking coordination: normal     Tremor   Resting tremor: absent  Intention tremor: absent  Action tremor: absent     Reflexes   Right brachioradialis: 2+  Left brachioradialis: 2+  Right biceps: 2+  Left biceps: 2+  Right triceps: 2+  Left triceps: 2+  Right patellar: 2+  Left patellar: 2+  Right achilles: 2+  Left achilles: 2+  Right Spivey: absent  Left Spivey: absent  Right ankle clonus: 2 beats present  Left ankle clonus: 2 beats present  Right plantar: normal  Left plantar: normal      Physical Exam  Constitutional: Oriented to person, place, and time. Appears well-developed and well-nourished.   HENT:   Head: Normocephalic and atraumatic.   Eyes: Pupils are equal, round, and reactive to light.   Neck: Normal range of motion. Neck supple.   Cardiovascular: Normal rate.    Pulmonary/Chest: Effort normal.   Musculoskeletal: Normal range of motion. Exhibits no edema.   Neurological: Alert and oriented to person, place, and time.   Reflex Scores:       Tricep reflexes are 2+ on the right side and 2+ on the left side.       Bicep reflexes are 2+ on the right side and 2+ on the left side.       Brachioradialis reflexes are 2+ on the right side and 2+ on the left side.       Patellar reflexes are 2+ on the right side and 2+ on the left side.       Achilles reflexes are 2+ on the right side and 2+ on the left side.  Skin: Skin is warm, dry and intact.   Psychiatric: Normal mood and affect. Speech is normal and behavior is normal. Judgment and thought content normal.   Nursing note and vitals reviewed.      Provider dictation:  I reviewed the imaging.   CT of the abdomen shows bilateral L5 pars defects and grade 2 L5-S1 spondylolisthesis.    MRI of the lumbar spine shows severe bilateral L5-S1 foraminal stenosis associated with grade 2 spondylolisthesis. T2 signal in the L5-S1  disc space as well as in the epidural space dorsal to the S1 vertebral body which may represent edema or hematoma but infection is in the differential.   Flex/ext x-ray lumbar spine shows grade 2 spondylolisthesis which has progressed compared to prior lumbar x-ray last month.     The patient is a 39-year-old male with a negative past medical history that presents today for evaluation of severe axial lumbar back pain. He reports prior back injury when he was about 8 years old playing football. He was playing golf about 3 1/2 weeks ago and reports acute worsening of lower back pain. This was associated with a fever up to 102.9 and night sweats for 3 days. The fever resolved with a course of Bactrim prescribed by PCP. Patient denies history of IVDU or immunosuppression. The pain is in the central lower back slightly more to the right side into the right buttock. Denies any lower extremity pain. He reports numbness in the right big toe that is mild. Denies any weakness. Denies any bowel/bladder dysfunction.     On exam slight pain limited right hip flexor weakness and 4/5 right EHL weakness. Antalgic gait.     Given the associated fever and abnormal signal on non contrast MRI in the disc space and epidural space, we will order infectious markers and repeat MRI with/without contrast to rule out infection. Patient was counseled to avoid bending/twisting with his lower back in the meantime. Given the rapid progression of his L5-S1 spondylolisthesis, indicating significant instability, and his intractable symptoms, patient was offered a right minimally invasive L5-S1 transforaminal lumbar interbody fusion with associated L5-S1 posterior instrumented fusion. It is also reasonable to continue conservative management if infectious work-up is negative. We will call the patient with results of MRI and infectious markers and further plan. He was informed to present to the ED if his symptoms worsen or if he develops progressive  weakness or bowel or bladder dysfunction.    1. Radiculopathy, unspecified spinal region  gabapentin (NEURONTIN) 300 MG capsule   2. Spondylolisthesis at L5-S1 level  Ambulatory referral/consult to Neurosurgery    Ambulatory referral/consult to Pain Clinic   3. Lumbar stenosis without neurogenic claudication  Ambulatory referral/consult to Neurosurgery   4. Spondylolisthesis of lumbar region  CANCELED: CT Lumbar Spine Without Contrast   5. Fever, unspecified fever cause  Sedimentation rate    C-REACTIVE PROTEIN    CBC auto differential    Procalcitonin    CANCELED: CULTURE, BLOOD    CANCELED: CULTURE, BLOOD    CANCELED: CT Lumbar Spine Without Contrast

## 2020-07-14 ENCOUNTER — NURSE TRIAGE (OUTPATIENT)
Dept: ADMINISTRATIVE | Facility: CLINIC | Age: 40
End: 2020-07-14

## 2020-07-14 DIAGNOSIS — M43.17 SPONDYLOLISTHESIS AT L5-S1 LEVEL: Primary | ICD-10-CM

## 2020-07-14 LAB
BASOPHILS # BLD AUTO: 0.05 K/UL (ref 0–0.2)
BASOPHILS NFR BLD: 0.5 % (ref 0–1.9)
CRP SERPL-MCNC: 105.1 MG/L (ref 0–8.2)
DIFFERENTIAL METHOD: ABNORMAL
EOSINOPHIL # BLD AUTO: 0.1 K/UL (ref 0–0.5)
EOSINOPHIL NFR BLD: 1.3 % (ref 0–8)
ERYTHROCYTE [DISTWIDTH] IN BLOOD BY AUTOMATED COUNT: 12.4 % (ref 11.5–14.5)
HCT VFR BLD AUTO: 38.1 % (ref 40–54)
HGB BLD-MCNC: 12.1 G/DL (ref 14–18)
IMM GRANULOCYTES # BLD AUTO: 0.04 K/UL (ref 0–0.04)
IMM GRANULOCYTES NFR BLD AUTO: 0.4 % (ref 0–0.5)
LYMPHOCYTES # BLD AUTO: 2 K/UL (ref 1–4.8)
LYMPHOCYTES NFR BLD: 18.7 % (ref 18–48)
MCH RBC QN AUTO: 30.5 PG (ref 27–31)
MCHC RBC AUTO-ENTMCNC: 31.8 G/DL (ref 32–36)
MCV RBC AUTO: 96 FL (ref 82–98)
MONOCYTES # BLD AUTO: 0.8 K/UL (ref 0.3–1)
MONOCYTES NFR BLD: 7 % (ref 4–15)
NEUTROPHILS # BLD AUTO: 7.8 K/UL (ref 1.8–7.7)
NEUTROPHILS NFR BLD: 72.1 % (ref 38–73)
NRBC BLD-RTO: 0 /100 WBC
PLATELET # BLD AUTO: 604 K/UL (ref 150–350)
PMV BLD AUTO: 9.7 FL (ref 9.2–12.9)
PROCALCITONIN SERPL IA-MCNC: 0.05 NG/ML
RBC # BLD AUTO: 3.97 M/UL (ref 4.6–6.2)
WBC # BLD AUTO: 10.75 K/UL (ref 3.9–12.7)

## 2020-07-15 ENCOUNTER — TELEPHONE (OUTPATIENT)
Dept: NEUROSURGERY | Facility: CLINIC | Age: 40
End: 2020-07-15

## 2020-07-15 NOTE — TELEPHONE ENCOUNTER
Wife calling concerned since MRI scheduled tomorrow, has not discussed yesterday's lab results with MD yet, pt has began running fever again tonight & has been afebrile since abx last week. Pt wanting to notify neuro of fever to be sure ok to still perform test tomorrow.

## 2020-07-15 NOTE — TELEPHONE ENCOUNTER
Patient's wife, Maggie called this Am to report her  ran fever last night of 100.2. He is scheduled for his MRI today at 4 PM. She is requesting that you call her with the results of his labs. Her number is [phone]. Thanks!

## 2020-07-15 NOTE — TELEPHONE ENCOUNTER
Called patient's wife and discussed results of labs. ESR and CRP are elevated. MRI Lumbar spine w/ and w/o contrast scheduled today. I will contact patient with results once completed.

## 2020-07-15 NOTE — TELEPHONE ENCOUNTER
Reason for Disposition   Caller requesting lab results    Additional Information   Negative: Lab calling with strep throat test results and triager can call in prescription   Negative: Lab calling with urinalysis test results and triager can call in prescription   Negative: Medication questions   Negative: ED call to PCP   Negative: Physician call to PCP   Negative: Call about patient who is currently hospitalized   Negative: Lab or radiology calling with CRITICAL test results   Negative: [1] Prescription not at pharmacy AND [2] was prescribed today by PCP   Negative: [1] Follow-up call from patient regarding patient's clinical status AND [2] information urgent   Negative: [1] Caller requests to speak ONLY to PCP AND [2] URGENT question   Negative: [1] Caller requests to speak to PCP now AND [2] won't tell us reason for call  (Exception: if 10 pm to 6 am, caller must first discuss reason for the call)   Negative: Notification of hospital admission   Negative: Notification of death    Protocols used: PCP CALL - NO TRIAGE-A-

## 2020-07-16 PROBLEM — M43.17 SPONDYLOLISTHESIS OF LUMBOSACRAL REGION: Status: ACTIVE | Noted: 2020-07-16

## 2020-07-16 PROBLEM — M46.26 OSTEOMYELITIS OF LUMBAR SPINE: Status: ACTIVE | Noted: 2020-07-16

## 2020-07-17 PROBLEM — B95.62 MRSA BACTEREMIA: Status: ACTIVE | Noted: 2020-07-17

## 2020-07-17 PROBLEM — R78.81 MRSA BACTEREMIA: Status: ACTIVE | Noted: 2020-07-17

## 2020-07-17 PROBLEM — M46.40 DISCITIS: Status: ACTIVE | Noted: 2020-07-17

## 2020-07-20 ENCOUNTER — TELEPHONE (OUTPATIENT)
Dept: INFECTIOUS DISEASES | Facility: CLINIC | Age: 40
End: 2020-07-20

## 2020-07-20 PROBLEM — M46.47 DISCITIS OF LUMBOSACRAL REGION: Status: ACTIVE | Noted: 2020-07-17

## 2020-07-20 NOTE — TELEPHONE ENCOUNTER
Spoke with Shannan and explained that Dr Cheatham's schedule is private and defaults to 1/4/21, but was corrected to 8/10

## 2020-07-20 NOTE — TELEPHONE ENCOUNTER
----- Message from Shannan Edwards RN sent at 7/20/2020  1:11 PM CDT -----  Please call the patient to schedule follow up appointment.    OR call myself, Shannan Edwards RN/Care Coordinator [phone], to advise, and I can place on the patient's discharge paperwork.    Thanks so much!!!

## 2020-07-20 NOTE — TELEPHONE ENCOUNTER
----- Message from Shannan Edwards RN sent at 7/20/2020  1:35 PM CDT -----  Did you know that you placed the F/U appt for Jan 4, 2021???

## 2020-07-21 ENCOUNTER — TELEPHONE (OUTPATIENT)
Dept: NEUROSURGERY | Facility: CLINIC | Age: 40
End: 2020-07-21

## 2020-07-21 DIAGNOSIS — M43.27 FUSION OF SPINE, LUMBOSACRAL REGION: ICD-10-CM

## 2020-07-21 NOTE — TELEPHONE ENCOUNTER
----- Message from Mariana Mullen NP sent at 7/20/2020 10:50 AM CDT -----  He needs a wound check POD#10 (DOS 7/17/20). He also needs a 4 week  post-op visit with upright lumbar xrays ap/lat prior to appt. THanks.

## 2020-07-22 ENCOUNTER — TELEPHONE (OUTPATIENT)
Dept: INFECTIOUS DISEASES | Facility: CLINIC | Age: 40
End: 2020-07-22

## 2020-07-22 NOTE — TELEPHONE ENCOUNTER
----- Message from Shannan Edwards RN sent at 7/22/2020  9:13 AM CDT -----  Zenobia has in her note to set up follow up for 2 weeks.  I had set up for 3 weeks.  Can you please reschedule this patient, and advise?  Thanks so much!  My contact number is [phone].

## 2020-07-23 ENCOUNTER — TELEPHONE (OUTPATIENT)
Dept: NEUROSURGERY | Facility: CLINIC | Age: 40
End: 2020-07-23

## 2020-07-23 PROCEDURE — G0180 MD CERTIFICATION HHA PATIENT: HCPCS | Mod: ,,, | Performed by: NEUROLOGICAL SURGERY

## 2020-07-23 PROCEDURE — G0180 PR HOME HEALTH MD CERTIFICATION: ICD-10-PCS | Mod: ,,, | Performed by: NEUROLOGICAL SURGERY

## 2020-07-23 NOTE — TELEPHONE ENCOUNTER
Patient's home health nurse, Yasmeen called about patient's pain. She said he was supposed to be D/c'd from the hospital with Oxycodone 7.5mg but his bottle at home says 5mg. She said his wife told her that patient was to be dc'd from the hospital a few days earlier than he was and was originally prescribed Oxycodone 5mg but was changed to 7.5 before discharge. His home health nurse said his pain is not being controlled with the 5mg. Could he get the 7.5mg called in? He uses [facility] on [address] in [place].

## 2020-07-23 NOTE — TELEPHONE ENCOUNTER
Please let patient know that I sent a new prescription for percocet 7.5 mg every 6 hours prn. He should stop taking the 5 mg tabs for now, but he needs to continue to reduce the amount of pain medication taken daily as tolerated. If he finishes the 7.5 tab prescription over the next week he can then start taking the 5 mg tabs as needed instead of requesting additional refills. Please tell him to contact our clinic if this is not providing some relief.

## 2020-07-29 ENCOUNTER — CLINICAL SUPPORT (OUTPATIENT)
Dept: NEUROSURGERY | Facility: CLINIC | Age: 40
End: 2020-07-29
Payer: COMMERCIAL

## 2020-07-29 PROCEDURE — 99999 PR PBB SHADOW E&M-EST. PATIENT-LVL I: ICD-10-PCS | Mod: PBBFAC,,,

## 2020-07-29 PROCEDURE — 99999 PR PBB SHADOW E&M-EST. PATIENT-LVL I: CPT | Mod: PBBFAC,,,

## 2020-07-30 ENCOUNTER — EXTERNAL HOME HEALTH (OUTPATIENT)
Dept: HOME HEALTH SERVICES | Facility: HOSPITAL | Age: 40
End: 2020-07-30
Payer: COMMERCIAL

## 2020-07-31 ENCOUNTER — DOCUMENT SCAN (OUTPATIENT)
Dept: HOME HEALTH SERVICES | Facility: HOSPITAL | Age: 40
End: 2020-07-31
Payer: COMMERCIAL

## 2020-07-31 NOTE — PROGRESS NOTES
Patient is 12 days s/p lumbar fusion with Dr. Davis. Incision is healing appropriately without redness, swelling, or purulent drainage noted. Patient denies tenderness at the site. Staples removed without complication. Patient reports good improvement in pain since the surgery. He is not requesting pain medication refill today. Reviewed narcotics policy with patient. Re-educated patient on post-operative restrictions and proper incision care. Instructed patient to keep incision clean, dry, and open-to-air. Patient aware of scheduled post-operative diagnostics and follow up appointment. Instructed patient to contact our office with any additional questions or concerns.

## 2020-08-04 ENCOUNTER — OFFICE VISIT (OUTPATIENT)
Dept: INFECTIOUS DISEASES | Facility: CLINIC | Age: 40
End: 2020-08-04
Payer: COMMERCIAL

## 2020-08-04 ENCOUNTER — TELEPHONE (OUTPATIENT)
Dept: INFECTIOUS DISEASES | Facility: CLINIC | Age: 40
End: 2020-08-04

## 2020-08-04 DIAGNOSIS — M46.47 DISCITIS OF LUMBOSACRAL REGION: Primary | ICD-10-CM

## 2020-08-04 PROCEDURE — 99213 PR OFFICE/OUTPT VISIT, EST, LEVL III, 20-29 MIN: ICD-10-PCS | Mod: 95,,, | Performed by: PHYSICIAN ASSISTANT

## 2020-08-04 PROCEDURE — 99213 OFFICE O/P EST LOW 20 MIN: CPT | Mod: 95,,, | Performed by: PHYSICIAN ASSISTANT

## 2020-08-04 NOTE — PROGRESS NOTES
Patient ID: Flakito Barajas is a 39 y.o. male.    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 Course of his illness is as follows:    - Denies IV drug use or recent tattoos. Hx of staph infection of R groin and knee, last treated approximately 1 year ago. He reports remote history dental abscess and needs a root canal but no active dental issues. He lives in [facility] with his wife who is a NICU nurse here at [facility]. He denies any history of surgery to his back or injections.  - Father's Day weekend played golf w/ acute worsening of lower back pain & fever thereafter.   - Went to [facility] and was given morphine and referred to his PCP  - Saw PCP and diagnosed with a UTI/ possible pancreatitis, prescribed bactrim and referred to urology. Fevers improved with Bactrim  - CT renal stone and UA ordered by Urology was negative. Lumbar degenerative findings were noted on CT and he was referred to NSGY  - NSGY ordered MRI lumbar spine which revealed progression of L5-S1 spondylolisthesis and questionable degenerative edema vs infectious process at L5-S1. Inflammatory markers and contrast MRI ordered  - Had worsening central low back pain with radiation to bilateral buttocks and intermittent fever so presented to [facility] ED.  - Contrast MRI resulted with discitis/osteomyelitis centered at L5-S1 noting surrounding epidural and presacral phlegmon with corresponding small abscesses as detailed.  Marrow signal abnormality involving the posterior aspect of L4 and 2nd sacral segment also concerning for osteomyelitis change. Also elevated ESR and CRP.  - Underwent phlegmon evacuation and fusion on 7/17/20 with Dr. Davis. Operative Cx: +MRSA  - Admission blood  cultures also +MRSA, but cleared within 24 hours  - Initially placed on Vanc + Ceftriaxone, but this was de-escalated to Vancomycin once cultures matured, and Rifampin was added for retained hardware  - Unable to get to therapeutic Vanc trough, so switched to Daptomycin once CPK was within acceptable range (initially was elevated)  - Discharged on 7/24/20 with Daptomycin 8mg/kg/day + Rifampin 300mg po BID with end date of IV abx 9/11/20 (Dr. Davis wanting 8-wk course), with need for suppressive Doxycycline for as long as hardware remains in place    Interval HPI:  - Patient reports he is feeling better. Still has back pain but it is improved from prior  - Taking IV and oral abx without issues  - Denies fever, chills, night sweats, abdominal pain, muscle pain, or diarrhea   - Chart review shows he recently went to the ED and was diagnosed with orthostatic hypotension      History reviewed. No pertinent past medical history.    Past Surgical History:   Procedure Laterality Date    BACK SURGERY      REPAIR OF TORSION OF TESTICLE Right     child       Review of patient's allergies indicates:  No Known Allergies    Current Outpatient Medications   Medication Instructions    DAPTOmycin (CUBICIN) 650 mg, Intravenous, Daily    dextroamphetamine-amphetamine (ADDERALL) 30 mg Tab 30 tablets, Oral, 2 times daily    diazePAM (VALIUM) 5 mg, Oral, Every 8 hours PRN    gabapentin (NEURONTIN) 300 mg, Oral, 3 times daily    oxyCODONE-acetaminophen (PERCOCET) 7.5-325 mg per tablet 1 tablet, Oral, Every 6 hours PRN    rifAMpin (RIFADIN) 600 mg, Oral, Daily         Review of Systems   Constitutional: Negative for activity change, appetite change, chills, fatigue and fever.   HENT: Negative for congestion and sinus pain.    Eyes: Negative for visual disturbance.   Respiratory: Negative for cough, chest tightness and shortness of breath.    Cardiovascular: Negative for chest pain, palpitations and leg swelling.    Gastrointestinal: Negative for abdominal pain, diarrhea, nausea and vomiting.   Genitourinary: Negative for dysuria, flank pain, hematuria and urgency.   Musculoskeletal: Positive for back pain. Negative for arthralgias, myalgias, neck pain and neck stiffness.   Skin: Negative for color change and rash.   Neurological: Negative for dizziness and headaches.   Psychiatric/Behavioral: Negative for confusion.           Objective:          Physical Exam  Constitutional:       General: He is not in acute distress.     Appearance: Normal appearance. He is well-developed. He is not diaphoretic.   HENT:      Head: Normocephalic and atraumatic.      Right Ear: External ear normal.      Left Ear: External ear normal.      Nose: Nose normal.   Eyes:      Extraocular Movements: Extraocular movements intact.      Conjunctiva/sclera: Conjunctivae normal.   Neck:      Musculoskeletal: Normal range of motion.   Pulmonary:      Effort: Pulmonary effort is normal. No respiratory distress.   Skin:     Coloration: Skin is not jaundiced.   Neurological:      Mental Status: He is alert and oriented to person, place, and time.           Assessment:           Flakito was seen today for back pain.    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 On 8-week course of Daptomycin in addition to Rifampin.  - Patient still has back pain, but reports it is improving  - Tolerating abx  - Labs reviewed. No leukocytosis and CPK is not elevated. ESR pending from today. CRP downtrending  - Continue Daptomycin 8mg/kg/day for 8 week course (end date 9/11/20)  - Continue Rifampin 300mg po BID for 3 months  - Suppressive Doxycycline 100mg po BID as long as hardware remains  - Has f/u with Dr. Davis on 8/17/20  - Pt can f/u with ID in 3 weeks unless there are concerns or change in  patient status, then can f/u sooner    Zenobia Chaudhry PA-C  Infectious Diseases  [facility]

## 2020-08-04 NOTE — TELEPHONE ENCOUNTER
Attempted to call pt's cell but had to leave . Spoke to pt's wife who is his listed emergency contact regarding labs recently drawn from . Patient's blood glucose was 40. He does not have a history of diabetes. Discussed with Maggie and she stated patient has been feeling sluggish and tired today, and occasionally gets lightheaded (recently went to ED and diagnosed with orthostatic hypotension).     Advised that he needs to consume carbohydrates immediately, and if he is ill they should take him to the nearest emergency room for further monitoring since hypoglycemia can be life-threatening. She voiced understanding and will keep an eye on him. At the least, she will call his PCP tomorrow to schedule an appointment.    MARTY

## 2020-08-17 ENCOUNTER — HOSPITAL ENCOUNTER (OUTPATIENT)
Dept: RADIOLOGY | Facility: HOSPITAL | Age: 40
Discharge: HOME | End: 2020-08-17
Attending: NEUROLOGICAL SURGERY
Payer: COMMERCIAL

## 2020-08-17 ENCOUNTER — OFFICE VISIT (OUTPATIENT)
Dept: NEUROSURGERY | Facility: CLINIC | Age: 40
End: 2020-08-17
Payer: COMMERCIAL

## 2020-08-17 VITALS — TEMPERATURE: 98 F | SYSTOLIC BLOOD PRESSURE: 138 MMHG | DIASTOLIC BLOOD PRESSURE: 88 MMHG

## 2020-08-17 DIAGNOSIS — G06.1 ABSCESS IN EPIDURAL SPACE OF LUMBAR SPINE: ICD-10-CM

## 2020-08-17 DIAGNOSIS — Z98.1 S/P LUMBAR FUSION: Primary | ICD-10-CM

## 2020-08-17 DIAGNOSIS — M43.27 FUSION OF SPINE, LUMBOSACRAL REGION: ICD-10-CM

## 2020-08-17 PROCEDURE — 72100 X-RAY EXAM L-S SPINE 2/3 VWS: CPT | Mod: TC,FY,PO

## 2020-08-17 PROCEDURE — 72100 X-RAY EXAM L-S SPINE 2/3 VWS: CPT | Mod: 26,,, | Performed by: RADIOLOGY

## 2020-08-17 PROCEDURE — 99024 PR POST-OP FOLLOW-UP VISIT: ICD-10-PCS | Mod: S$GLB,,, | Performed by: NEUROLOGICAL SURGERY

## 2020-08-17 PROCEDURE — 72100 XR LUMBAR SPINE AP AND LATERAL: ICD-10-PCS | Mod: 26,,, | Performed by: RADIOLOGY

## 2020-08-17 PROCEDURE — 99024 POSTOP FOLLOW-UP VISIT: CPT | Mod: S$GLB,,, | Performed by: NEUROLOGICAL SURGERY

## 2020-08-17 PROCEDURE — 99999 PR PBB SHADOW E&M-EST. PATIENT-LVL III: CPT | Mod: PBBFAC,,, | Performed by: NEUROLOGICAL SURGERY

## 2020-08-17 PROCEDURE — 99999 PR PBB SHADOW E&M-EST. PATIENT-LVL III: ICD-10-PCS | Mod: PBBFAC,,, | Performed by: NEUROLOGICAL SURGERY

## 2020-08-17 NOTE — PROGRESS NOTES
Neurosurgery History & Physical    Patient ID: Flakito Barajas is a 39 y.o. male.    Chief Complaint   Patient presents with    Post-op Evaluation     4 week post op lumbar fusion. Patient states he is still sore and his L foot continues tingling and sensetive to touch.        Review of Systems   Constitutional: Negative for chills, diaphoresis, fatigue and fever.   HENT: Negative for congestion, hearing loss, rhinorrhea and sore throat.    Eyes: Negative for photophobia, pain, redness and visual disturbance.   Respiratory: Negative for cough, chest tightness, shortness of breath and wheezing.    Cardiovascular: Negative for chest pain, palpitations and leg swelling.   Gastrointestinal: Negative for abdominal distention, abdominal pain, constipation, diarrhea, nausea and vomiting.   Genitourinary: Negative for difficulty urinating, dysuria, frequency, hematuria and urgency.   Musculoskeletal: Positive for back pain. Negative for gait problem, myalgias, neck pain and neck stiffness.   Skin: Negative for pallor and rash.   Neurological: Positive for weakness and numbness. Negative for dizziness, seizures, speech difficulty, light-headedness and headaches.   Psychiatric/Behavioral: Negative for confusion, hallucinations and sleep disturbance.       No past medical history on file.  Social History     Socioeconomic History    Marital status:      Spouse name: Not on file    Number of children: Not on file    Years of education: Not on file    Highest education level: Not on file   Occupational History    Not on file   Social Needs    Financial resource strain: Not on file    Food insecurity     Worry: Not on file     Inability: Not on file    Transportation needs     Medical: Not on file     Non-medical: Not on file   Tobacco Use    Smoking status: Never Smoker    Smokeless tobacco: Never Used   Substance and Sexual Activity    Alcohol use: Not Currently    Drug use: Not on file    Sexual activity:  Yes     Partners: Female   Lifestyle    Physical activity     Days per week: Not on file     Minutes per session: Not on file    Stress: Not on file   Relationships    Social connections     Talks on phone: Not on file     Gets together: Not on file     Attends [employer] service: Not on file     Active member of club or organization: Not on file     Attends meetings of clubs or organizations: Not on file     Relationship status: Not on file   Other Topics Concern    Not on file   Social History Narrative    Not on file     No family history on file.  Review of patient's allergies indicates:  No Known Allergies    Current Outpatient Medications:     DAPTOmycin (CUBICIN) 500 mg injection, Inject 650 mg into the vein once daily., Disp: 43827 mg, Rfl: 1    dextroamphetamine-amphetamine (ADDERALL) 30 mg Tab, Take 30 tablets by mouth 2 (two) times a day. , Disp: , Rfl:     gabapentin (NEURONTIN) 300 MG capsule, Take 1 capsule (300 mg total) by mouth 3 (three) times daily., Disp: 90 capsule, Rfl: 1    rifAMpin (RIFADIN) 300 MG capsule, Take 2 capsules (600 mg total) by mouth once daily., Disp: 104 capsule, Rfl: 0    diazePAM (VALIUM) 5 MG tablet, Take 1 tablet (5 mg total) by mouth every 8 (eight) hours as needed (muscle spasms). (Patient not taking: Reported on 8/17/2020), Disp: 20 tablet, Rfl: 0    oxyCODONE-acetaminophen (PERCOCET) 7.5-325 mg per tablet, Take 1 tablet by mouth every 6 (six) hours as needed for Pain. (Patient not taking: Reported on 8/17/2020), Disp: 28 tablet, Rfl: 0  Blood pressure 138/88, temperature 98 °F (36.7 °C).      Neurologic Exam  Mental Status   Oriented to person, place, and time.   Oriented to person.    Oriented to place.   Oriented to time.   Follows 3 step commands.   Attention: normal. Concentration: normal.   Speech: speech is normal   Level of consciousness: alert  Knowledge: consistent with education.   Able to name object. Able to read. Able to repeat. Able to write.  Normal comprehension.      Cranial Nerves      CN II   Visual acuity: normal  Right visual field deficit: none  Left visual field deficit: none      CN III, IV, VI   Pupils are equal, round, and reactive to light.  Right pupil: Size: 3 mm. Shape: regular. Reactivity: brisk. Consensual response: intact.   Left pupil: Size: 3 mm. Shape: regular. Reactivity: brisk. Consensual response: intact.   CN III: no CN III palsy  CN VI: no CN VI palsy  Nystagmus: none   Diplopia: none  Ophthalmoparesis: none  Conjugate gaze: present     CN V   Right facial sensation deficit: none  Left facial sensation deficit: none     CN VII   Right facial weakness: none  Left facial weakness: none     CN VIII   Hearing: intact     CN IX, X   CN IX normal.   CN X normal.      CN XI   Right sternocleidomastoid strength: normal  Left sternocleidomastoid strength: normal  Right trapezius strength: normal  Left trapezius strength: normal     CN XII   Fasciculations: absent  Tongue deviation: none     Motor Exam   Muscle bulk: normal  Overall muscle tone: normal  Right arm pronator drift: absent  Left arm pronator drift: absent     Strength   Right deltoid: 5/5  Left deltoid: 5/5  Right biceps: 5/5  Left biceps: 5/5  Right triceps: 5/5  Left triceps: 5/5  Right wrist flexion: 5/5  Left wrist flexion: 5/5  Right wrist extension: 5/5  Left wrist extension: 5/5  Right interossei: 5/5  Left interossei: 5/5  Right iliopsoas: 5/5  Left iliopsoas: 5/5  Right quadriceps: 5/5  Left quadriceps: 5/5  Right hamstrin/5  Left hamstrin/5  Right anterior tibial: 5/5  Left anterior tibial: 5/5  Right posterior tibial: 5/5  Left posterior tibial: 5/5  Right peroneal: 5/5  Left peroneal: 4-/5  Right gastroc: 5/5  Left gastroc: 5/5  Right EHL: 4/5  Left EHL: 4/5     Sensory Exam   Right arm light touch: normal  Left arm light touch: normal  Right leg light touch: normal  Left leg light touch: Decreased in L5 and S1 distribution worse in L5  distribution     Gait, Coordination, and Reflexes      Gait  Gait: normal     Coordination   Romberg: negative  Finger to nose coordination: normal  Heel to shin coordination: normal  Tandem walking coordination: normal     Tremor   Resting tremor: absent  Intention tremor: absent  Action tremor: absent     Reflexes   Right brachioradialis: 2+  Left brachioradialis: 2+  Right biceps: 2+  Left biceps: 2+  Right triceps: 2+  Left triceps: 2+  Right patellar: 2+  Left patellar: 2+  Right achilles: 2+  Left achilles: 2+  Right Spivey: absent  Left Spivey: absent  Right ankle clonus: 2 beats present  Left ankle clonus: 2 beats present  Right plantar: normal  Left plantar: normal    Physical Exam  Constitutional: Oriented to person, place, and time. Appears well-developed and well-nourished.   HENT:   Head: Normocephalic and atraumatic.   Eyes: Pupils are equal, round, and reactive to light.   Neck: Normal range of motion. Neck supple.   Cardiovascular: Normal rate.    Pulmonary/Chest: Effort normal.   Musculoskeletal: Normal range of motion. Exhibits no edema.   Neurological: Alert and oriented to person, place, and time.   Reflex Scores:       Tricep reflexes are 2+ on the right side and 2+ on the left side.       Bicep reflexes are 2+ on the right side and 2+ on the left side.       Brachioradialis reflexes are 2+ on the right side and 2+ on the left side.       Patellar reflexes are 2+ on the right side and 2+ on the left side.       Achilles reflexes are 2+ on the right side and 2+ on the left side.  Skin: Skin is warm, dry and intact.   Psychiatric: Normal mood and affect. Speech is normal and behavior is normal. Judgment and thought content normal.   Nursing note and vitals reviewed.      Provider dictation:  I reviewed the imaging.   X-ray lumbar spine shows intact hardware with no complication.     The patient is a 39 year old male who presents for 4 week post-op appointment s/p right L5-S1 TLIF with posterior  instrumented fusion and L5-S1 laminectomy for removal of epidural phlegmon. Patient reports significant improvement in lower extremity weakness and numbness. He reports some muscular back pain, but improving. He currently remains on Daptomycin + Rifampin 300mg po BID for MRSA osteomyelitis/discitis with end date of IV abx 9/11/20 being managed by ID. Currently denies fever or chills.     On exam patient has improvement in lower extremity weakness with now 5/5 right DF, 4-/5 left DF, and 4/5 with B/L EHL. Decreased sensation in left L5 > S1 distribution. Incision is well healed.     Overall, Mr. Barajas is recovering well from surgery. We will send orders for outpatient PT for further strengthening. Patient is clear to return to work 6 weeks post-op once brace removed. He was informed to remove the brace in 2 weeks. We will obtain an updated MRI lumbar spine with and without contrast at end of IV antibiotic course which is scheduled to end on 9/11/20 and call him with the results. He will follow-up as scheduled for 3 month post-op appointment with repeat lumbar x-ray.     1. S/P lumbar fusion  Ambulatory referral/consult to Physical/Occupational Therapy    MRI Lumbar Spine W WO Contrast   2. Abscess in epidural space of lumbar spine  Ambulatory referral/consult to Physical/Occupational Therapy    MRI Lumbar Spine W WO Contrast

## 2020-08-18 RX ORDER — PREGABALIN 75 MG/1
75 CAPSULE ORAL 2 TIMES DAILY
Qty: 60 CAPSULE | Refills: 2 | Status: SHIPPED | OUTPATIENT
Start: 2020-08-18 | End: 2021-01-03 | Stop reason: SDUPTHER

## 2020-08-25 ENCOUNTER — OFFICE VISIT (OUTPATIENT)
Dept: INFECTIOUS DISEASES | Facility: CLINIC | Age: 40
End: 2020-08-25
Payer: COMMERCIAL

## 2020-08-25 DIAGNOSIS — M46.47 DISCITIS OF LUMBOSACRAL REGION: Primary | ICD-10-CM

## 2020-08-25 DIAGNOSIS — Z79.2 RECEIVING INTRAVENOUS ANTIBIOTIC TREATMENT AT HOME: ICD-10-CM

## 2020-08-25 DIAGNOSIS — R11.0 NAUSEA: ICD-10-CM

## 2020-08-25 DIAGNOSIS — R78.81 MRSA BACTEREMIA: ICD-10-CM

## 2020-08-25 DIAGNOSIS — R73.09 BLOOD GLUCOSE ABNORMAL: ICD-10-CM

## 2020-08-25 DIAGNOSIS — D72.19 PERIPHERAL EOSINOPHILIA: ICD-10-CM

## 2020-08-25 DIAGNOSIS — B95.62 MRSA BACTEREMIA: ICD-10-CM

## 2020-08-25 PROCEDURE — 99215 OFFICE O/P EST HI 40 MIN: CPT | Mod: 95,,, | Performed by: PHYSICIAN ASSISTANT

## 2020-08-25 PROCEDURE — 99215 PR OFFICE/OUTPT VISIT, EST, LEVL V, 40-54 MIN: ICD-10-PCS | Mod: 95,,, | Performed by: PHYSICIAN ASSISTANT

## 2020-08-25 NOTE — PROGRESS NOTES
The patient location is: Home  The chief complaint leading to consultation is: *Back pain    Visit type: audiovisual    Face to Face time with patient: 20 mins  40 minutes of total time spent on the encounter, which includes face to face time and non-face to face time preparing to see the patient (eg, review of tests), Obtaining and/or reviewing separately obtained history, Documenting clinical information in the electronic or other health record, Independently interpreting results (not separately reported) and communicating results to the patient/family/caregiver, or Care coordination (not separately reported).     Each patient to whom he or she provides medical services by telemedicine is:  (1) informed of the relationship between the physician and patient and the respective role of any other health care provider with respect to management of the patient; and (2) notified that he or she may decline to receive medical services by telemedicine and may withdraw from such care at any time.      Patient ID: Flakito Barajas is a 39 y.o. male.    Subjective:           Chief Complaint: Back Pain       39-year-old  male with no significant PMH other than distant hx of back pain 10 yrs ago, who was recently seen at [facility] for MRSA osteomyelitis/diskitis of L5-S1 with MRSA bacteremia of unknown source, s/p phlegmon evacuation and fusion on 7/17/20. Course of his illness is as follows:    - Denies IV drug use or recent tattoos. Hx of staph infection of R groin and knee, last treated approximately 1 year ago. He reports remote history dental abscess and needs a root canal but no active dental issues. He lives in [facility] with his wife who is a NICU nurse here at [facility]. He denies any history of surgery to his back or injections.  - Father's Day weekend played golf w/ acute worsening of lower back pain & fever thereafter.   - Went to [facility] and was given morphine and referred to his PCP  - Saw PCP and diagnosed with  a UTI/ possible pancreatitis, prescribed bactrim and referred to urology. Fevers improved with Bactrim  - CT renal stone and UA ordered by Urology was negative. Lumbar degenerative findings were noted on CT and he was referred to NSGY  - NSGY ordered MRI lumbar spine which revealed progression of L5-S1 spondylolisthesis and questionable degenerative edema vs infectious process at L5-S1. Inflammatory markers and contrast MRI ordered  - Had worsening central low back pain with radiation to bilateral buttocks and intermittent fever so presented to [facility] ED.  - Contrast MRI resulted with discitis/osteomyelitis centered at L5-S1 noting surrounding epidural and presacral phlegmon with corresponding small abscesses as detailed.  Marrow signal abnormality involving the posterior aspect of L4 and 2nd sacral segment also concerning for osteomyelitis change. Also elevated ESR and CRP.  - Underwent phlegmon evacuation and fusion on 7/17/20 with Dr. Davis. Operative Cx: +MRSA  - Admission blood cultures also +MRSA, but cleared within 24 hours  - Initially placed on Vanc + Ceftriaxone, but this was de-escalated to Vancomycin once cultures matured, and Rifampin was added for retained hardware  - Unable to get to therapeutic Vanc trough, so switched to Daptomycin once CPK was within acceptable range (initially was elevated)  - Discharged on 7/24/20 with Daptomycin 8mg/kg/day + Rifampin 300mg po BID with end date of IV abx 9/11/20 (Dr. Davis wanting 8-wk course), with need for suppressive Doxycycline for as long as hardware remains in place    F/u 8/4/20 : Doing well. No issues with abx. Still has back pain but it is improved from prior. Denies fever, chills, night sweats, abdominal pain, muscle pain, or diarrhea. Labs received later that evening revealed BG 40. Advised to make appt with his PCP and bring to the ED if he was feeling ill.    Interval HPI:  - Patient reports continued improvement in back pain  - However  still having tingling/shooting pain to his left foot. NSGY aware and recently started on Lyrica  - Referral placed for outpatient PT but he has not started this yet  - Taking IV and oral abx without issues  - Occasionally has nausea which is relieved with Zofran. No vomiting  - Has not had f/u with his PCP for abnormal blood glucose levels  - Denies fever, chills, night sweats, abdominal pain, muscle pain, or diarrhea       History reviewed. No pertinent past medical history.    Past Surgical History:   Procedure Laterality Date    BACK SURGERY      REPAIR OF TORSION OF TESTICLE Right     child       Review of patient's allergies indicates:  No Known Allergies    Current Outpatient Medications   Medication Instructions    DAPTOmycin (CUBICIN) 650 mg, Intravenous, Daily    dextroamphetamine-amphetamine (ADDERALL) 30 mg Tab 30 tablets, Oral, 2 times daily    diazePAM (VALIUM) 5 mg, Oral, Every 8 hours PRN    oxyCODONE-acetaminophen (PERCOCET) 7.5-325 mg per tablet 1 tablet, Oral, Every 6 hours PRN    pregabalin (LYRICA) 75 mg, Oral, 2 times daily    rifAMpin (RIFADIN) 600 mg, Oral, Daily         Review of Systems   Constitutional: Negative for activity change, appetite change, chills, fatigue and fever.   HENT: Negative for congestion and sinus pain.    Eyes: Negative for visual disturbance.   Respiratory: Negative for cough, chest tightness and shortness of breath.    Cardiovascular: Negative for chest pain, palpitations and leg swelling.   Gastrointestinal: Positive for nausea. Negative for abdominal pain, diarrhea and vomiting.   Genitourinary: Negative for dysuria, flank pain, hematuria and urgency.   Musculoskeletal: Positive for back pain. Negative for arthralgias, myalgias, neck pain and neck stiffness.        Neuropathic pain to left foot   Skin: Negative for color change and rash.   Neurological: Negative for dizziness and headaches.   Psychiatric/Behavioral: Negative for confusion.            Objective:        Physical Exam  Constitutional:       General: He is not in acute distress.     Appearance: Normal appearance. He is well-developed. He is not diaphoretic.   HENT:      Head: Normocephalic and atraumatic.      Right Ear: External ear normal.      Left Ear: External ear normal.      Nose: Nose normal.   Eyes:      Extraocular Movements: Extraocular movements intact.      Conjunctiva/sclera: Conjunctivae normal.   Neck:      Musculoskeletal: Normal range of motion.   Pulmonary:      Effort: Pulmonary effort is normal. No respiratory distress.   Skin:     Coloration: Skin is not jaundiced.   Neurological:      Mental Status: He is alert and oriented to person, place, and time.           Assessment:           Flakito was seen today for back pain.    Diagnoses and all orders for this visit:    Discitis of lumbosacral region    MRSA bacteremia    Receiving intravenous antibiotic treatment at home    Peripheral eosinophilia    Blood glucose abnormal    Nausea      Plan:   MRSA osteomyelitis/diskitis of L5-S1 with MRSA bacteremia of unknown source, s/p phlegmon evacuation and fusion on 7/17/20. On 8-week course of Daptomycin (per NSGY preference) in addition to Rifampin. This patient is being followed for a medical condition that, if not appropriately treated, poses a risk to body, life, or limb. He is being treated with antimicrobials, which are considered high-risk medications with increased potential for adverse reaction in some groups.  - Patient still has back pain, but reports it is improving  - Tolerating abx. Occasional nausea, relieved by Zofran from other provider. Unclear if abx-related or not. Not severe in nature, per patient  - Labs reviewed. CRP WNL but ESR elevated compared to last week. Unclear signifance  - No leukocytosis but peripheral eosinophils are elevated. However downtrending as of recent labs. Patient not complaining of rash, SOB, or swelling and oesinophilia not present  while on abx in the hospital, nor the first couple of weeks at home. Unclear if due to abx or other cause. Will continue to monitor trend and symptoms.  - Blood glucose low again (63) on 8/18, but normal on 8/11 and 8/26. Again advised patient to schedule f/u with his PCP for monitoring and further workup of hypoglycemia if necessary  - Continue Daptomycin 8mg/kg/day for 8 week course (end date 9/11/20)  - Continue Rifampin 300mg po BID for 3 months  - Suppressive Doxycycline 100mg po BID as long as hardware remains  - Repeat imaging scheduled for 9/15/20 per NSGY. Will have patient f/u after this with Dr. Cheatham for final review and orders to remove PICC line. PICC to be flushed from 9/11-until visit.    Discussed case with Dr. Cheatham.    Zenobia Chaudhry PA-C  Infectious Diseases  [facility]

## 2020-09-17 DIAGNOSIS — R78.81 MRSA BACTEREMIA: ICD-10-CM

## 2020-09-17 DIAGNOSIS — Z96.9 RETAINED ORTHOPEDIC HARDWARE: Primary | ICD-10-CM

## 2020-09-17 DIAGNOSIS — B95.62 MRSA BACTEREMIA: ICD-10-CM

## 2020-09-17 RX ORDER — DOXYCYCLINE HYCLATE 100 MG
100 TABLET ORAL EVERY 12 HOURS
Qty: 180 TABLET | Refills: 2 | Status: SHIPPED | OUTPATIENT
Start: 2020-09-17 | End: 2020-10-23 | Stop reason: SDUPTHER

## 2020-09-17 NOTE — PROGRESS NOTES
Patient cancelled his appt for today with Dr. Cheatham. Had to reschedule his MRI due to machine being down. Completed IV abx therapy on 9/11/20. Labs reviewed: No leukocytosis, normal CRP, almost normal ESR. Has f/u scheduled with Neurosurgery on 10/13/20.    Will order his suppressive Doxycycline 100mg po BID and have PICC line pulled. He should continue Rifampin 300mg po BID.    Needs 1 month f/u with repeat labs on suppression.    FRR

## 2020-09-24 ENCOUNTER — OFFICE VISIT (OUTPATIENT)
Dept: INFECTIOUS DISEASES | Facility: CLINIC | Age: 40
End: 2020-09-24
Payer: COMMERCIAL

## 2020-09-24 DIAGNOSIS — Z96.9 RETAINED ORTHOPEDIC HARDWARE: ICD-10-CM

## 2020-09-24 DIAGNOSIS — A49.02 MRSA INFECTION: ICD-10-CM

## 2020-09-24 DIAGNOSIS — M46.47 DISCITIS OF LUMBOSACRAL REGION: Primary | ICD-10-CM

## 2020-09-24 DIAGNOSIS — G06.2 EPIDURAL ABSCESS: ICD-10-CM

## 2020-09-24 PROBLEM — R78.81 MRSA BACTEREMIA: Status: RESOLVED | Noted: 2020-07-17 | Resolved: 2020-09-24

## 2020-09-24 PROBLEM — B95.62 MRSA BACTEREMIA: Status: RESOLVED | Noted: 2020-07-17 | Resolved: 2020-09-24

## 2020-09-24 PROCEDURE — 99213 OFFICE O/P EST LOW 20 MIN: CPT | Mod: 95,,, | Performed by: PHYSICIAN ASSISTANT

## 2020-09-24 PROCEDURE — 99213 PR OFFICE/OUTPT VISIT, EST, LEVL III, 20-29 MIN: ICD-10-PCS | Mod: 95,,, | Performed by: PHYSICIAN ASSISTANT

## 2020-09-24 RX ORDER — RIFAMPIN 300 MG/1
300 CAPSULE ORAL EVERY 12 HOURS
Qty: 60 CAPSULE | Refills: 0 | Status: SHIPPED | OUTPATIENT
Start: 2020-09-24 | End: 2020-10-23

## 2020-09-24 NOTE — PROGRESS NOTES
The patient location is: Home  The chief complaint leading to consultation is: Back pain    Visit type: audiovisual    Face to Face time with patient: 11 mins  20 minutes of total time spent on the encounter, which includes face to face time and non-face to face time preparing to see the patient (eg, review of tests), Obtaining and/or reviewing separately obtained history, Documenting clinical information in the electronic or other health record, Independently interpreting results (not separately reported) and communicating results to the patient/family/caregiver, or Care coordination (not separately reported).     Each patient to whom he or she provides medical services by telemedicine is:  (1) informed of the relationship between the physician and patient and the respective role of any other health care provider with respect to management of the patient; and (2) notified that he or she may decline to receive medical services by telemedicine and may withdraw from such care at any time.      Patient ID: Flakito Barajas is a 39 y.o. male.    Subjective:           Chief Complaint: Back Pain       39-year-old  male with no significant PMH other than distant hx of back pain 10 yrs ago, who was recently seen at [facility] for MRSA osteomyelitis/diskitis of L5-S1 with MRSA bacteremia of unknown source, s/p phlegmon evacuation and fusion on 7/17/20. Course of his illness is as follows:    - Denies IV drug use or recent tattoos. Hx of staph infection of R groin and knee, last treated approximately 1 year ago. He reports remote history dental abscess and needs a root canal but no active dental issues. He lives in [facility] with his wife who is a NICU nurse here at [facility]. He denies any history of surgery to his back or injections.  - Father's Day weekend played golf w/ acute worsening of lower back pain & fever thereafter.   - Went to [facility] and was given morphine and referred to his PCP  - Saw PCP and diagnosed with  a UTI/ possible pancreatitis, prescribed bactrim and referred to urology. Fevers improved with Bactrim  - CT renal stone and UA ordered by Urology was negative. Lumbar degenerative findings were noted on CT and he was referred to NSGY  - NSGY ordered MRI lumbar spine which revealed progression of L5-S1 spondylolisthesis and questionable degenerative edema vs infectious process at L5-S1. Inflammatory markers and contrast MRI ordered  - Had worsening central low back pain with radiation to bilateral buttocks and intermittent fever so presented to [facility] ED.  - Contrast MRI resulted with discitis/osteomyelitis centered at L5-S1 noting surrounding epidural and presacral phlegmon with corresponding small abscesses as detailed.  Marrow signal abnormality involving the posterior aspect of L4 and 2nd sacral segment also concerning for osteomyelitis change. Also elevated ESR and CRP.  - Underwent phlegmon evacuation and fusion on 7/17/20 with Dr. Davis. Operative Cx: +MRSA  - Admission blood cultures also +MRSA, but cleared within 24 hours  - Initially placed on Vanc + Ceftriaxone, but this was de-escalated to Vancomycin once cultures matured, and Rifampin was added for retained hardware  - Unable to get to therapeutic Vanc trough, so switched to Daptomycin once CPK was within acceptable range (initially was elevated)  - Discharged on 7/24/20 with Daptomycin 8mg/kg/day + Rifampin 300mg po BID with end date of IV abx 9/11/20 (Dr. Davis wanting 8-wk course), with need for suppressive Doxycycline for as long as hardware remains in place    F/u 8/4/20 : Doing well. No issues with abx. Still has back pain but it is improved from prior. Denies fever, chills, night sweats, abdominal pain, muscle pain, or diarrhea. Labs received later that evening revealed BG 40. Advised to make appt with his PCP and bring to the ED if he was feeling ill.    F/u 8/25/20: Continues to improve. Still having tingling/shooting pain in left  foot, being managed by NSGY. Referral placed for outpatient PT but not started on this yet. No issues with abx. Occasional nausea relieved by Zofran. CRP WNL but ESR elevated from prior visit. No leukocytosis but persistent eosinophilia. Patient not complaining of rash, SOB, or swelling and oesinophilia not present while on abx in the hospital, nor the first couple of weeks at home. Unclear if due to abx or other cause. Still with fluctuating blood glucose levels, advised again to f/u with his PCP. Continued on Dapto/Rifampin through end date 9/11/20, with f/u with Dr. Cheatham after.    9/17/20: Patient cancelled his appt for today with Dr. Chaetham. Had to reschedule his MRI due to machine being down. Completed IV abx therapy on 9/11/20. Labs reviewed: No leukocytosis, normal CRP, almost normal ESR. Has f/u scheduled with Neurosurgery on 10/13/20. Started on suppressive Doxycycline 100mg po BID and have PICC line pulled. He should continue Rifampin 300mg po BID. Needs 1 month f/u with repeat labs on suppression.    Interval HPI:  - Patient has completed IV antibiotics and reports feeling well  - Still feels like his back is not yet back to normal, but every day he feels stronger  - Never did Physical Therapy as advised by NSGY  - Denies fever, chills, night sweats  - Just had MRI and is anxious to know results      History reviewed. No pertinent past medical history.    Past Surgical History:   Procedure Laterality Date    BACK SURGERY      REPAIR OF TORSION OF TESTICLE Right     child       Review of patient's allergies indicates:  No Known Allergies    Current Outpatient Medications   Medication Instructions    dextroamphetamine-amphetamine (ADDERALL) 30 mg Tab 30 tablets, Oral, 2 times daily    diazePAM (VALIUM) 5 mg, Oral, Every 8 hours PRN    doxycycline (VIBRA-TABS) 100 mg, Oral, Every 12 hours    oxyCODONE-acetaminophen (PERCOCET) 7.5-325 mg per tablet 1 tablet, Oral, Every 6 hours PRN    pregabalin  (LYRICA) 75 mg, Oral, 2 times daily         Review of Systems   Constitutional: Negative for activity change, appetite change, chills, fatigue and fever.   HENT: Negative for congestion and sinus pain.    Eyes: Negative for visual disturbance.   Respiratory: Negative for cough, chest tightness and shortness of breath.    Cardiovascular: Negative for chest pain, palpitations and leg swelling.   Gastrointestinal: Negative for abdominal pain, diarrhea, nausea and vomiting.   Genitourinary: Negative for dysuria, flank pain, hematuria and urgency.   Musculoskeletal: Positive for back pain. Negative for arthralgias, myalgias, neck pain and neck stiffness.        Neuropathic pain to left foot   Skin: Negative for color change and rash.   Neurological: Negative for dizziness and headaches.   Psychiatric/Behavioral: Negative for confusion.           Objective:        Physical Exam  Constitutional:       General: He is not in acute distress.     Appearance: Normal appearance. He is well-developed. He is not diaphoretic.   HENT:      Head: Normocephalic and atraumatic.      Right Ear: External ear normal.      Left Ear: External ear normal.      Nose: Nose normal.   Eyes:      Extraocular Movements: Extraocular movements intact.      Conjunctiva/sclera: Conjunctivae normal.   Neck:      Musculoskeletal: Normal range of motion.   Pulmonary:      Effort: Pulmonary effort is normal. No respiratory distress.   Skin:     Coloration: Skin is not jaundiced.   Neurological:      Mental Status: He is alert and oriented to person, place, and time.           Assessment:           Flakito was seen today for back pain.    Diagnoses and all orders for this visit:    Discitis of lumbosacral region    Epidural abscess  -     rifAMpin (RIFADIN) 300 MG capsule; Take 1 capsule (300 mg total) by mouth every 12 (twelve) hours.    Retained orthopedic hardware  -     rifAMpin (RIFADIN) 300 MG capsule; Take 1 capsule (300 mg total) by mouth every 12  (twelve) hours.    MRSA infection  -     rifAMpin (RIFADIN) 300 MG capsule; Take 1 capsule (300 mg total) by mouth every 12 (twelve) hours.      Plan:   MRSA osteomyelitis/diskitis of L5-S1 with MRSA bacteremia of unknown source, s/p phlegmon evacuation and fusion on 7/17/20. Completed 8-week course of Daptomycin which ended on 9/11/20 in addition to Rifampin. This patient is being followed for a medical condition that, if not appropriately treated, poses a risk to body, life, or limb. He is being treated with antimicrobials, which are considered high-risk medications with increased potential for adverse reaction in some groups.  - Patient completed IV Daptomycin on 9/11/20, PICC line was removed  - Back pain improving a little every day. Has been able to return to work. Still not going to PT  - Reviewed MRI results, which still show some edema but overall interpreted as improved  - Just picked up Doxycycline prescription today. Discussed that he would be on this suppressive abx likely for as long as hardware in his back remains  - Still has 1 month left to complete 3 months of Rifampin 300mg po BID, but he states he is almost out of the meds. Will send refill today  - RTC after 1 month of the Doxycyline with repeat CBC, CMP, ESR, CRP. Will likely d/c Rifampin at this time  - Has f/u with Dr. Davis on 10/13/20      MARLO BaC  Infectious Diseases  [facility]

## 2020-10-13 ENCOUNTER — HOSPITAL ENCOUNTER (OUTPATIENT)
Dept: RADIOLOGY | Facility: HOSPITAL | Age: 40
Discharge: HOME | End: 2020-10-13
Attending: NEUROLOGICAL SURGERY
Payer: COMMERCIAL

## 2020-10-13 ENCOUNTER — OFFICE VISIT (OUTPATIENT)
Dept: NEUROSURGERY | Facility: CLINIC | Age: 40
End: 2020-10-13
Payer: COMMERCIAL

## 2020-10-13 VITALS
WEIGHT: 184 LBS | HEART RATE: 94 BPM | BODY MASS INDEX: 26.34 KG/M2 | SYSTOLIC BLOOD PRESSURE: 127 MMHG | HEIGHT: 70 IN | DIASTOLIC BLOOD PRESSURE: 84 MMHG

## 2020-10-13 DIAGNOSIS — G06.1 ABSCESS IN EPIDURAL SPACE OF LUMBAR SPINE: Primary | ICD-10-CM

## 2020-10-13 DIAGNOSIS — M43.27 FUSION OF SPINE, LUMBOSACRAL REGION: ICD-10-CM

## 2020-10-13 PROCEDURE — 72100 X-RAY EXAM L-S SPINE 2/3 VWS: CPT | Mod: TC,FY,PO

## 2020-10-13 PROCEDURE — 99024 PR POST-OP FOLLOW-UP VISIT: ICD-10-PCS | Mod: S$GLB,,, | Performed by: NEUROLOGICAL SURGERY

## 2020-10-13 PROCEDURE — 72100 XR LUMBAR SPINE AP AND LATERAL: ICD-10-PCS | Mod: 26,,, | Performed by: RADIOLOGY

## 2020-10-13 PROCEDURE — 72100 X-RAY EXAM L-S SPINE 2/3 VWS: CPT | Mod: 26,,, | Performed by: RADIOLOGY

## 2020-10-13 PROCEDURE — 99024 POSTOP FOLLOW-UP VISIT: CPT | Mod: S$GLB,,, | Performed by: NEUROLOGICAL SURGERY

## 2020-10-13 PROCEDURE — 99999 PR PBB SHADOW E&M-EST. PATIENT-LVL III: ICD-10-PCS | Mod: PBBFAC,,, | Performed by: NEUROLOGICAL SURGERY

## 2020-10-13 PROCEDURE — 99999 PR PBB SHADOW E&M-EST. PATIENT-LVL III: CPT | Mod: PBBFAC,,, | Performed by: NEUROLOGICAL SURGERY

## 2020-10-13 RX ORDER — ONDANSETRON 8 MG/1
TABLET, ORALLY DISINTEGRATING ORAL
COMMUNITY
Start: 2020-07-27

## 2020-10-13 NOTE — PROGRESS NOTES
Neurosurgery History & Physical    Patient ID: Flakito Barajas is a 39 y.o. male.    Chief Complaint   Patient presents with    Back Pain     3mth post op.. back pain is better but stiff feeling.        Review of Systems   Constitutional: Negative for chills, diaphoresis, fatigue and fever.   HENT: Negative for congestion, hearing loss, rhinorrhea and sore throat.    Eyes: Negative for photophobia, pain, redness and visual disturbance.   Respiratory: Negative for cough, chest tightness, shortness of breath and wheezing.    Cardiovascular: Negative for chest pain, palpitations and leg swelling.   Gastrointestinal: Negative for abdominal distention, abdominal pain, constipation, diarrhea, nausea and vomiting.   Genitourinary: Negative for difficulty urinating, dysuria, frequency, hematuria and urgency.   Musculoskeletal: Positive for back pain. Negative for gait problem, myalgias, neck pain and neck stiffness.   Skin: Negative for pallor and rash.   Neurological: Positive for weakness. Negative for dizziness, seizures, speech difficulty, light-headedness, numbness and headaches.   Psychiatric/Behavioral: Negative for confusion, hallucinations and sleep disturbance.       Past Medical History:   Diagnosis Date    Discitis of lumbosacral region     Epidural abscess     MRSA bacteremia 07/2020     Social History     Socioeconomic History    Marital status:      Spouse name: Not on file    Number of children: Not on file    Years of education: Not on file    Highest education level: Not on file   Occupational History    Not on file   Social Needs    Financial resource strain: Not on file    Food insecurity     Worry: Not on file     Inability: Not on file    Transportation needs     Medical: Not on file     Non-medical: Not on file   Tobacco Use    Smoking status: Never Smoker    Smokeless tobacco: Never Used   Substance and Sexual Activity    Alcohol use: Not Currently    Drug use: Not on file     "Sexual activity: Yes     Partners: Female   Lifestyle    Physical activity     Days per week: Not on file     Minutes per session: Not on file    Stress: Not on file   Relationships    Social connections     Talks on phone: Not on file     Gets together: Not on file     Attends Baptism service: Not on file     Active member of club or organization: Not on file     Attends meetings of clubs or organizations: Not on file     Relationship status: Not on file   Other Topics Concern    Not on file   Social History Narrative    Not on file     No family history on file.  Review of patient's allergies indicates:  No Known Allergies    Current Outpatient Medications:     dextroamphetamine-amphetamine (ADDERALL) 30 mg Tab, Take 30 tablets by mouth 2 (two) times a day. , Disp: , Rfl:     doxycycline (VIBRA-TABS) 100 MG tablet, Take 1 tablet (100 mg total) by mouth every 12 (twelve) hours., Disp: 180 tablet, Rfl: 2    ondansetron (ZOFRAN-ODT) 8 MG TbDL, DIS ONE T PO  Q 8 H PRN, Disp: , Rfl:     pregabalin (LYRICA) 75 MG capsule, Take 1 capsule (75 mg total) by mouth 2 (two) times daily., Disp: 60 capsule, Rfl: 2    rifAMpin (RIFADIN) 300 MG capsule, Take 1 capsule (300 mg total) by mouth every 12 (twelve) hours., Disp: 60 capsule, Rfl: 0  Blood pressure 127/84, pulse 94, height 5' 10" (1.778 m), weight 83.5 kg (184 lb).      Neurologic Exam  Mental Status   Oriented to person, place, and time.   Oriented to person.    Oriented to place.   Oriented to time.   Follows 3 step commands.   Attention: normal. Concentration: normal.   Speech: speech is normal   Level of consciousness: alert  Knowledge: consistent with education.   Able to name object. Able to read. Able to repeat. Able to write. Normal comprehension.      Cranial Nerves      CN II   Visual acuity: normal  Right visual field deficit: none  Left visual field deficit: none      CN III, IV, VI   Pupils are equal, round, and reactive to light.  Right pupil: " Size: 3 mm. Shape: regular. Reactivity: brisk. Consensual response: intact.   Left pupil: Size: 3 mm. Shape: regular. Reactivity: brisk. Consensual response: intact.   CN III: no CN III palsy  CN VI: no CN VI palsy  Nystagmus: none   Diplopia: none  Ophthalmoparesis: none  Conjugate gaze: present     CN V   Right facial sensation deficit: none  Left facial sensation deficit: none     CN VII   Right facial weakness: none  Left facial weakness: none     CN VIII   Hearing: intact     CN IX, X   CN IX normal.   CN X normal.      CN XI   Right sternocleidomastoid strength: normal  Left sternocleidomastoid strength: normal  Right trapezius strength: normal  Left trapezius strength: normal     CN XII   Fasciculations: absent  Tongue deviation: none     Motor Exam   Muscle bulk: normal  Overall muscle tone: normal  Right arm pronator drift: absent  Left arm pronator drift: absent     Strength   Right deltoid: 5/5  Left deltoid: 5/5  Right biceps: 5/5  Left biceps: 5/5  Right triceps: 5/5  Left triceps: 5/5  Right wrist flexion: 5/5  Left wrist flexion: 5/5  Right wrist extension: 5/5  Left wrist extension: 5/5  Right interossei: 5/5  Left interossei: 5/5  Right iliopsoas: 5/5  Left iliopsoas: 5/5  Right quadriceps: 5/5  Left quadriceps: 5/5  Right hamstrin/5  Left hamstrin/5  Right anterior tibial: 5/5  Left anterior tibial: 5/5  Right posterior tibial: 5/5  Left posterior tibial: 5/5  Right peroneal: 5/5  Left peroneal: 4-/5  Right gastroc: 5/5  Left gastroc: 5/5  Right EHL: 4/5  Left EHL: 4/5     Sensory Exam   Right arm light touch: normal  Left arm light touch: normal  Right leg light touch: normal  Left leg light touch: normal     Gait, Coordination, and Reflexes      Gait  Gait: normal     Coordination   Romberg: negative  Finger to nose coordination: normal  Heel to shin coordination: normal  Tandem walking coordination: normal     Tremor   Resting tremor: absent  Intention tremor: absent  Action tremor:  absent     Reflexes   Right brachioradialis: 2+  Left brachioradialis: 2+  Right biceps: 2+  Left biceps: 2+  Right triceps: 2+  Left triceps: 2+  Right patellar: 2+  Left patellar: 2+  Right achilles: 2+  Left achilles: 2+  Right Spivey: absent  Left Spivey: absent  Right ankle clonus: 2 beats present  Left ankle clonus: 2 beats present  Right plantar: normal  Left plantar: normal    Physical Exam  Constitutional: Oriented to person, place, and time. Appears well-developed and well-nourished.   HENT:   Head: Normocephalic and atraumatic.   Eyes: Pupils are equal, round, and reactive to light.   Neck: Normal range of motion. Neck supple.   Cardiovascular: Normal rate.    Pulmonary/Chest: Effort normal.   Musculoskeletal: Normal range of motion. Exhibits no edema.   Neurological: Alert and oriented to person, place, and time.   Reflex Scores:       Tricep reflexes are 2+ on the right side and 2+ on the left side.       Bicep reflexes are 2+ on the right side and 2+ on the left side.       Brachioradialis reflexes are 2+ on the right side and 2+ on the left side.       Patellar reflexes are 2+ on the right side and 2+ on the left side.       Achilles reflexes are 2+ on the right side and 2+ on the left side.  Skin: Skin is warm, dry and intact.   Psychiatric: Normal mood and affect. Speech is normal and behavior is normal. Judgment and thought content normal.   Nursing note and vitals reviewed.      Provider dictation:  I reviewed the imaging.   X-ray lumbar spine shows intact hardware with no complication.  There has been some settling of the cage in L5 but no evidence of screw pullout.  MRI shows resolution of any epidural thickening or enhancement.  There is significant improvement but some residual presacral enhancement.    The patient is a 39 year old male who presents for 3 week post-op appointment s/p right L5-S1 TLIF with posterior instrumented fusion and L5-S1 laminectomy for removal of epidural phlegmon.   He currently remains on PO Abx managed by ID. Currently denies fever or chills.  He states that he is continuing to feel better with improvement of his low back pain and now complete resolution of his left lower extremity numbness.  He continues to have left ankle dorsiflexion weakness.    On exam patient has improvement in lower extremity weakness with now 5/5 right DF, 4-/5 left DF, and 4/5 with B/L EHL.  No numbness.    Doing well.  MRI shows response treatment.  Improvement in sensory function but no significant change in weakness since his last visit.  Will order physical therapy to help work on his footdrop.  Follow-up in 3 months with CT of the lumbar spine without contrast to assess the fusion.  Continue antibiotics per ID.      1. Abscess in epidural space of lumbar spine

## 2020-10-20 ENCOUNTER — TELEPHONE (OUTPATIENT)
Dept: NEUROSURGERY | Facility: CLINIC | Age: 40
End: 2020-10-20

## 2020-10-21 ENCOUNTER — TELEPHONE (OUTPATIENT)
Dept: NEUROSURGERY | Facility: CLINIC | Age: 40
End: 2020-10-21

## 2020-10-21 NOTE — TELEPHONE ENCOUNTER
----- Message from Michela Patterson sent at 10/21/2020  3:33 PM CDT -----  Hello    Just an FYI pt denied thrpy

## 2020-10-23 ENCOUNTER — OFFICE VISIT (OUTPATIENT)
Dept: INFECTIOUS DISEASES | Facility: CLINIC | Age: 40
End: 2020-10-23
Payer: COMMERCIAL

## 2020-10-23 DIAGNOSIS — Z96.9 RETAINED ORTHOPEDIC HARDWARE: ICD-10-CM

## 2020-10-23 DIAGNOSIS — M46.47 DISCITIS OF LUMBOSACRAL REGION: Primary | ICD-10-CM

## 2020-10-23 PROCEDURE — 99214 OFFICE O/P EST MOD 30 MIN: CPT | Mod: 95,,, | Performed by: INTERNAL MEDICINE

## 2020-10-23 PROCEDURE — 99214 PR OFFICE/OUTPT VISIT, EST, LEVL IV, 30-39 MIN: ICD-10-PCS | Mod: 95,,, | Performed by: INTERNAL MEDICINE

## 2020-10-23 RX ORDER — DOXYCYCLINE HYCLATE 100 MG
100 TABLET ORAL EVERY 12 HOURS
Qty: 60 TABLET | Refills: 4 | Status: SHIPPED | OUTPATIENT
Start: 2020-10-23 | End: 2020-11-22

## 2020-10-23 NOTE — PROGRESS NOTES
The patient location is: Home  The chief complaint leading to consultation is: Back pain    Visit type: audiovisual    Face to Face time with patient: 11 mins  20 minutes of total time spent on the encounter, which includes face to face time and non-face to face time preparing to see the patient (eg, review of tests), Obtaining and/or reviewing separately obtained history, Documenting clinical information in the electronic or other health record, Independently interpreting results (not separately reported) and communicating results to the patient/family/caregiver, or Care coordination (not separately reported).     Each patient to whom he or she provides medical services by telemedicine is:  (1) informed of the relationship between the physician and patient and the respective role of any other health care provider with respect to management of the patient; and (2) notified that he or she may decline to receive medical services by telemedicine and may withdraw from such care at any time.      Patient ID: Flakito Barajas is a 39 y.o. male.    Subjective:           Chief Complaint: Follow-up       39-year-old  male with no significant PMH other than distant hx of back pain 10 yrs ago, who was recently seen at [facility] for MRSA osteomyelitis/diskitis of L5-S1 with MRSA bacteremia of unknown source, s/p phlegmon evacuation and fusion on 7/17/20. Course of his illness is as follows:    - Denies IV drug use or recent tattoos. Hx of staph infection of R groin and knee, last treated approximately 1 year ago. He reports remote history dental abscess and needs a root canal but no active dental issues. He lives in [place] with his wife who is a NICU nurse here at [facility]. He denies any history of surgery to his back or injections.  - Father's Day weekend played golf w/ acute worsening of lower back pain & fever thereafter.   - Went to [facility] and was given morphine and referred to his PCP  - Saw PCP and diagnosed with  a UTI/ possible pancreatitis, prescribed bactrim and referred to urology. Fevers improved with Bactrim  - CT renal stone and UA ordered by Urology was negative. Lumbar degenerative findings were noted on CT and he was referred to NSGY  - NSGY ordered MRI lumbar spine which revealed progression of L5-S1 spondylolisthesis and questionable degenerative edema vs infectious process at L5-S1. Inflammatory markers and contrast MRI ordered  - Had worsening central low back pain with radiation to bilateral buttocks and intermittent fever so presented to [facility] ED.  - Contrast MRI resulted with discitis/osteomyelitis centered at L5-S1 noting surrounding epidural and presacral phlegmon with corresponding small abscesses as detailed.  Marrow signal abnormality involving the posterior aspect of L4 and 2nd sacral segment also concerning for osteomyelitis change. Also elevated ESR and CRP.  - Underwent phlegmon evacuation and fusion on 7/17/20 with Dr. Davis. Operative Cx: +MRSA  - Admission blood cultures also +MRSA, but cleared within 24 hours  - Initially placed on Vanc + Ceftriaxone, but this was de-escalated to Vancomycin once cultures matured, and Rifampin was added for retained hardware  - Unable to get to therapeutic Vanc trough, so switched to Daptomycin once CPK was within acceptable range (initially was elevated)  - Discharged on 7/24/20 with Daptomycin 8mg/kg/day + Rifampin 300mg po BID with end date of IV abx 9/11/20 (Dr. Davis wanting 8-wk course), with need for suppressive Doxycycline for as long as hardware remains in place    F/u 8/4/20 : Doing well. No issues with abx. Still has back pain but it is improved from prior. Denies fever, chills, night sweats, abdominal pain, muscle pain, or diarrhea. Labs received later that evening revealed BG 40. Advised to make appt with his PCP and bring to the ED if he was feeling ill.    8/25/20: Continues to improve. Still having tingling/shooting pain in left foot,  being managed by NSGY. Referral placed for outpatient PT but not started on this yet. No issues with abx. Occasional nausea relieved by Zofran. CRP WNL but ESR elevated from prior visit. No leukocytosis but persistent eosinophilia. Patient not complaining of rash, SOB, or swelling and oesinophilia not present while on abx in the hospital, nor the first couple of weeks at home. Unclear if due to abx or other cause. Still with fluctuating blood glucose levels, advised again to f/u with his PCP. Continued on Dapto/Rifampin through end date 9/11/20 9/17/20: Completed IV abx therapy on 9/11/20. Normal CRP, almost normal ESR. Has f/u scheduled with Neurosurgery on 10/13/20. Started on suppressive Doxycycline 100mg po BID and have PICC line pulled. He should continue Rifampin 300mg po BID.     9/21/20: MRI: Bone marrow edema of L5 and S1 improved from prior study. Prevertebral soft tissue edema with small focus of nonenhancing fluid at the anterior aspect of the disc space, improved from prior study. There is edema of the epidural fat at L5-S1, improved from prior study, with no definite residual epidural abscess    Interval HPI:  - Tolerating Doxycycline and Rifampin  - Still feels like his back is almost back to normal  - Denies fever, chills, night sweats      Past Medical History:   Diagnosis Date    Discitis of lumbosacral region     Epidural abscess     MRSA bacteremia 07/2020       Past Surgical History:   Procedure Laterality Date    BACK SURGERY      REPAIR OF TORSION OF TESTICLE Right     child       Review of patient's allergies indicates:  No Known Allergies    Current Outpatient Medications   Medication Instructions    dextroamphetamine-amphetamine (ADDERALL) 30 mg Tab 30 tablets, Oral, 2 times daily    doxycycline (VIBRA-TABS) 100 mg, Oral, Every 12 hours    ondansetron (ZOFRAN-ODT) 8 MG TbDL DIS ONE T PO  Q 8 H PRN    pregabalin (LYRICA) 75 mg, Oral, 2 times daily         Review of Systems    Constitutional: Negative for activity change, appetite change, chills, fatigue and fever.   HENT: Negative for congestion and sinus pain.    Eyes: Negative for visual disturbance.   Respiratory: Negative for cough, chest tightness and shortness of breath.    Cardiovascular: Negative for chest pain, palpitations and leg swelling.   Gastrointestinal: Negative for abdominal pain, diarrhea, nausea and vomiting.   Genitourinary: Negative for dysuria, flank pain, hematuria and urgency.   Musculoskeletal: Positive for back pain. Negative for arthralgias, myalgias, neck pain and neck stiffness.   Skin: Negative for color change and rash.   Neurological: Negative for dizziness and headaches.   Psychiatric/Behavioral: Negative for confusion.           Objective:        Physical Exam  Constitutional:       General: He is not in acute distress.     Appearance: Normal appearance. He is well-developed. He is not diaphoretic.   HENT:      Head: Normocephalic and atraumatic.      Right Ear: External ear normal.      Left Ear: External ear normal.      Nose: Nose normal.   Eyes:      Extraocular Movements: Extraocular movements intact.      Conjunctiva/sclera: Conjunctivae normal.   Neck:      Musculoskeletal: Normal range of motion.   Pulmonary:      Effort: Pulmonary effort is normal. No respiratory distress.   Skin:     Coloration: Skin is not jaundiced.   Neurological:      Mental Status: He is alert and oriented to person, place, and time.           Assessment:           Flakito was seen today for follow-up.    Diagnoses and all orders for this visit:    Discitis of lumbosacral region  -     doxycycline (VIBRA-TABS) 100 MG tablet; Take 1 tablet (100 mg total) by mouth every 12 (twelve) hours.  -     CBC auto differential; Future  -     Comprehensive Metabolic Panel; Future  -     C-Reactive Protein; Future    Retained orthopedic hardware  -     doxycycline (VIBRA-TABS) 100 MG tablet; Take 1 tablet (100 mg total) by mouth  every 12 (twelve) hours.  -     CBC auto differential; Future  -     Comprehensive Metabolic Panel; Future  -     C-Reactive Protein; Future      Plan:     MRSA osteomyelitis/diskitis of L5-S1 with MRSA bacteremia of unknown source, s/p phlegmon evacuation and fusion on 7/17/20. Completed 8-week course of Daptomycin which ended on 9/11/20 in addition to Rifampin. This patient is being followed for a medical condition that, if not appropriately treated, poses a risk to body, life, or limb. He is being treated with antimicrobials, which are considered high-risk medications with increased potential for adverse reaction in some groups.    - Patient completed Rifampin   - Continue Doxycycline. Length of therapy lifelong  - RTC in 4 months  - Labs prior    Isaiah Cheatham MD  Infectious Diseases  [facility]

## 2020-11-19 ENCOUNTER — PATIENT MESSAGE (OUTPATIENT)
Dept: INFECTIOUS DISEASES | Facility: CLINIC | Age: 40
End: 2020-11-19

## 2021-01-04 RX ORDER — PREGABALIN 75 MG/1
75 CAPSULE ORAL 2 TIMES DAILY
Qty: 60 CAPSULE | Refills: 2 | Status: SHIPPED | OUTPATIENT
Start: 2021-01-04 | End: 2021-02-24 | Stop reason: SDUPTHER

## 2021-01-19 ENCOUNTER — OFFICE VISIT (OUTPATIENT)
Dept: NEUROSURGERY | Facility: CLINIC | Age: 41
End: 2021-01-19
Payer: COMMERCIAL

## 2021-01-19 ENCOUNTER — HOSPITAL ENCOUNTER (OUTPATIENT)
Dept: RADIOLOGY | Facility: HOSPITAL | Age: 41
Discharge: HOME | End: 2021-01-19
Attending: NEUROLOGICAL SURGERY
Payer: COMMERCIAL

## 2021-01-19 VITALS
SYSTOLIC BLOOD PRESSURE: 108 MMHG | TEMPERATURE: 99 F | WEIGHT: 184.06 LBS | DIASTOLIC BLOOD PRESSURE: 64 MMHG | BODY MASS INDEX: 26.35 KG/M2 | HEIGHT: 70 IN

## 2021-01-19 DIAGNOSIS — M43.27 FUSION OF SPINE, LUMBOSACRAL REGION: ICD-10-CM

## 2021-01-19 DIAGNOSIS — Z98.1 S/P LUMBAR FUSION: Primary | ICD-10-CM

## 2021-01-19 DIAGNOSIS — M43.17 SPONDYLOLISTHESIS AT L5-S1 LEVEL: ICD-10-CM

## 2021-01-19 DIAGNOSIS — G06.1 ABSCESS IN EPIDURAL SPACE OF LUMBAR SPINE: ICD-10-CM

## 2021-01-19 PROCEDURE — 72131 CT LUMBAR SPINE WITHOUT CONTRAST: ICD-10-PCS | Mod: 26,,, | Performed by: RADIOLOGY

## 2021-01-19 PROCEDURE — 3008F BODY MASS INDEX DOCD: CPT | Mod: CPTII,S$GLB,, | Performed by: NEUROLOGICAL SURGERY

## 2021-01-19 PROCEDURE — 72131 CT LUMBAR SPINE W/O DYE: CPT | Mod: 26,,, | Performed by: RADIOLOGY

## 2021-01-19 PROCEDURE — 99214 PR OFFICE/OUTPT VISIT, EST, LEVL IV, 30-39 MIN: ICD-10-PCS | Mod: S$GLB,,, | Performed by: NEUROLOGICAL SURGERY

## 2021-01-19 PROCEDURE — 3008F PR BODY MASS INDEX (BMI) DOCUMENTED: ICD-10-PCS | Mod: CPTII,S$GLB,, | Performed by: NEUROLOGICAL SURGERY

## 2021-01-19 PROCEDURE — 99214 OFFICE O/P EST MOD 30 MIN: CPT | Mod: S$GLB,,, | Performed by: NEUROLOGICAL SURGERY

## 2021-01-19 PROCEDURE — 99999 PR PBB SHADOW E&M-EST. PATIENT-LVL III: ICD-10-PCS | Mod: PBBFAC,,, | Performed by: NEUROLOGICAL SURGERY

## 2021-01-19 PROCEDURE — 72131 CT LUMBAR SPINE W/O DYE: CPT | Mod: TC,PO

## 2021-01-19 PROCEDURE — 99999 PR PBB SHADOW E&M-EST. PATIENT-LVL III: CPT | Mod: PBBFAC,,, | Performed by: NEUROLOGICAL SURGERY

## 2021-01-19 PROCEDURE — 1125F AMNT PAIN NOTED PAIN PRSNT: CPT | Mod: S$GLB,,, | Performed by: NEUROLOGICAL SURGERY

## 2021-01-19 PROCEDURE — 1125F PR PAIN SEVERITY QUANTIFIED, PAIN PRESENT: ICD-10-PCS | Mod: S$GLB,,, | Performed by: NEUROLOGICAL SURGERY

## 2021-01-19 RX ORDER — DOXYCYCLINE HYCLATE 100 MG
100 TABLET ORAL 2 TIMES DAILY
COMMUNITY

## 2021-02-24 RX ORDER — PREGABALIN 75 MG/1
75 CAPSULE ORAL 2 TIMES DAILY
Qty: 60 CAPSULE | Refills: 2 | Status: SHIPPED | OUTPATIENT
Start: 2021-02-24 | End: 2021-04-28 | Stop reason: SDUPTHER

## 2021-11-08 ENCOUNTER — PATIENT MESSAGE (OUTPATIENT)
Dept: INFECTIOUS DISEASES | Facility: CLINIC | Age: 41
End: 2021-11-08
Payer: COMMERCIAL

## 2021-11-09 ENCOUNTER — PATIENT MESSAGE (OUTPATIENT)
Dept: INFECTIOUS DISEASES | Facility: CLINIC | Age: 41
End: 2021-11-09
Payer: COMMERCIAL

## 2021-11-09 ENCOUNTER — TELEPHONE (OUTPATIENT)
Dept: INFECTIOUS DISEASES | Facility: CLINIC | Age: 41
End: 2021-11-09
Payer: COMMERCIAL

## 2022-01-19 ENCOUNTER — PATIENT MESSAGE (OUTPATIENT)
Dept: INFECTIOUS DISEASES | Facility: CLINIC | Age: 42
End: 2022-01-19
Payer: COMMERCIAL

## 2022-01-25 ENCOUNTER — TELEPHONE (OUTPATIENT)
Dept: INFECTIOUS DISEASES | Facility: CLINIC | Age: 42
End: 2022-01-25
Payer: COMMERCIAL

## 2022-01-25 ENCOUNTER — PATIENT MESSAGE (OUTPATIENT)
Dept: INFECTIOUS DISEASES | Facility: CLINIC | Age: 42
End: 2022-01-25
Payer: COMMERCIAL

## 2022-01-25 NOTE — TELEPHONE ENCOUNTER
----- Message from Miranda Parish MA sent at 1/25/2022 10:50 AM CST -----  Type: Needs Medical Advice  Who Called:  Flakito Adams Call Back Number: [phone]  Additional Information: patient would like to schedule an appointment with labs.  He is reporting fever, chills, body aches, mild nausea.  Please call to discuss

## 2022-01-27 ENCOUNTER — TELEPHONE (OUTPATIENT)
Dept: INFECTIOUS DISEASES | Facility: CLINIC | Age: 42
End: 2022-01-27
Payer: COMMERCIAL

## 2022-02-02 ENCOUNTER — OFFICE VISIT (OUTPATIENT)
Dept: PULMONOLOGY | Facility: CLINIC | Age: 42
End: 2022-02-02
Payer: COMMERCIAL

## 2022-02-02 VITALS
DIASTOLIC BLOOD PRESSURE: 80 MMHG | BODY MASS INDEX: 28.64 KG/M2 | WEIGHT: 199.63 LBS | HEART RATE: 96 BPM | OXYGEN SATURATION: 96 % | SYSTOLIC BLOOD PRESSURE: 125 MMHG

## 2022-02-02 DIAGNOSIS — Z87.09 HISTORY OF ASTHMA: Primary | ICD-10-CM

## 2022-02-02 DIAGNOSIS — R06.00 DYSPNEA, UNSPECIFIED TYPE: ICD-10-CM

## 2022-02-02 DIAGNOSIS — U07.1 COVID-19: ICD-10-CM

## 2022-02-02 PROCEDURE — 99204 PR OFFICE/OUTPT VISIT, NEW, LEVL IV, 45-59 MIN: ICD-10-PCS | Mod: S$GLB,,, | Performed by: NURSE PRACTITIONER

## 2022-02-02 PROCEDURE — 99204 OFFICE O/P NEW MOD 45 MIN: CPT | Mod: S$GLB,,, | Performed by: NURSE PRACTITIONER

## 2022-02-02 RX ORDER — ALBUTEROL SULFATE 90 UG/1
AEROSOL, METERED RESPIRATORY (INHALATION)
COMMUNITY
Start: 2022-01-19

## 2022-02-02 RX ORDER — VALACYCLOVIR HYDROCHLORIDE 1 G/1
TABLET, FILM COATED ORAL
COMMUNITY
Start: 2021-11-10

## 2022-02-02 RX ORDER — IPRATROPIUM BROMIDE AND ALBUTEROL SULFATE 2.5; .5 MG/3ML; MG/3ML
SOLUTION RESPIRATORY (INHALATION) EVERY 4 HOURS PRN
COMMUNITY
Start: 2022-01-19

## 2022-02-02 RX ORDER — METHYLPREDNISOLONE 4 MG/1
TABLET ORAL
COMMUNITY
Start: 2022-01-19

## 2022-02-02 RX ORDER — TESTOSTERONE CYPIONATE 200 MG/ML
INJECTION, SOLUTION INTRAMUSCULAR
COMMUNITY
Start: 2021-11-13

## 2022-02-02 RX ORDER — BUDESONIDE 0.5 MG/2ML
0.5 INHALANT ORAL 2 TIMES DAILY
Qty: 120 ML | Refills: 5 | Status: SHIPPED | OUTPATIENT
Start: 2022-02-02 | End: 2023-02-02

## 2022-02-02 RX ORDER — AZITHROMYCIN 250 MG/1
TABLET, FILM COATED ORAL
COMMUNITY
Start: 2022-01-19

## 2022-02-02 NOTE — PROGRESS NOTES
SUBJECTIVE:    Patient ID: Flakito Barajas is a 41 y.o. male.    Chief Complaint: Shortness of Breath (Shortness of breath ref by Dr.Tammy Jones )    HPI     Patient status post Covid in January and  he is still short of breath with exertion.  He states he used to be an asthmatic as a child and does still have rescue medication that he will use.  He has not run fever in a week. He is no longer wheezing like he was when he first had covid and the cough is much better.    Past Medical History:   Diagnosis Date    Asthma     Discitis of lumbosacral region     Epidural abscess     MRSA bacteremia 2020     Past Surgical History:   Procedure Laterality Date    BACK SURGERY      REPAIR OF TORSION OF TESTICLE Right     child     No family history on file.     Social History:   Marital Status:   Occupation: Data Unavailable  Alcohol History:  reports current alcohol use.  Tobacco History:  reports that he has never smoked. He has never used smokeless tobacco.  Drug History:  has no history on file for drug use.    Review of patient's allergies indicates:  No Known Allergies    Current Outpatient Medications   Medication Sig Dispense Refill    albuterol (PROVENTIL/VENTOLIN HFA) 90 mcg/actuation inhaler Inhale into the lungs.      albuterol-ipratropium (DUO-NEB) 2.5 mg-0.5 mg/3 mL nebulizer solution Take by nebulization every 4 (four) hours as needed.      dextroamphetamine-amphetamine 30 mg Tab Take 30 tablets by mouth 2 (two) times a day.       ondansetron (ZOFRAN-ODT) 8 MG TbDL DIS ONE T PO  Q 8 H PRN      testosterone cypionate (DEPOTESTOTERONE CYPIONATE) 200 mg/mL injection SMARTSI.75 Milliliter(s) IM Once a Week      valACYclovir (VALTREX) 1000 MG tablet SMARTSI By Mouth Every 12 Hours PRN      azithromycin (Z-BOY) 250 MG tablet Take by mouth.      budesonide (PULMICORT) 0.5 mg/2 mL nebulizer solution Take 2 mLs (0.5 mg total) by nebulization 2 (two) times daily. Controller 120 mL 5     doxycycline (VIBRA-TABS) 100 MG tablet Take 100 mg by mouth 2 (two) times daily.      methylPREDNISolone (MEDROL DOSEPACK) 4 mg tablet Take by mouth.      pregabalin (LYRICA) 75 MG capsule Take 1 capsule (75 mg total) by mouth 2 (two) times daily. 60 capsule 2     No current facility-administered medications for this visit.           Review of Systems  General: had night sweats and fever for 2 weeks, none for the last week   Eyes: Vision is good.  ENT:  No sinusitis or pharyngitis.   Heart:: No chest pain or palpitations.  Lungs: cough better the past two days, it is dry, shortness of breath still persist, was wheezing bad when had covid.  GI: No Nausea, vomiting, constipation, diarrhea, or reflux.  : No dysuria, hesitancy, or nocturia.  Musculoskeletal: No joint pain or myalgias.  Skin: No lesions or rashes.  Neuro: No headaches or neuropathy.  Lymph: No edema or adenopathy.  Psych: No anxiety or depression.  Endo: No weight change.    OBJECTIVE:      /80 (BP Location: Right arm, Patient Position: Sitting)   Pulse 96   Wt 90.5 kg (199 lb 9.6 oz)   SpO2 96%   BMI 28.64 kg/m²     Physical Exam  GENERAL: younger patient in no distress.  HEENT: Pupils equal and reactive. Extraocular movements intact. Nose intact.      Pharynx moist.  NECK: Supple.   HEART: Regular rate and rhythm. No murmur or gallop auscultated.  LUNGS: faint expiratory wheeze to left posteriorly but clears with several deep breaths  ABDOMEN: Bowel sounds present. Non-tender, no masses palpated.  EXTREMITIES: Normal muscle tone and joint movement, no cyanosis or clubbing.   LYMPHATICS: No adenopathy palpated, no edema.  SKIN: Dry, intact, no lesions.   NEURO: Cranial nerves II-XII intact. Motor strength 5/5 bilaterally, upper and lower extremities.  PSYCH: Appropriate affect.    Assessment:       1. History of asthma    2. Dyspnea, unspecified type    3. COVID-19       Plan:       History of asthma    Dyspnea, unspecified type  -      Complete PFT with bronchodilator; Future    COVID-19    Other orders  -     budesonide (PULMICORT) 0.5 mg/2 mL nebulizer solution; Take 2 mLs (0.5 mg total) by nebulization 2 (two) times daily. Controller  Dispense: 120 mL; Refill: 5      Need labs from [facility]   PFT  Chest xray in 4 weeks   Budesonide twice a day in nebulizer, rinse after you use it  Albuterol is as needed every 4-6 hours for shortness of breath and cough   Follow up in about 2 months (around 4/2/2022).

## 2022-02-02 NOTE — PATIENT INSTRUCTIONS
Need labs from [facility]   PFT  Chest xray in 4 weeks   Budesonide twice a day in nebulizer, rinse after you use it  Albuterol is as needed every 4-6 hours for shortness of breath and cough   Follow up in about 2 months (around 4/2/2022).

## 2022-03-02 ENCOUNTER — TELEPHONE (OUTPATIENT)
Dept: PULMONOLOGY | Facility: CLINIC | Age: 42
End: 2022-03-02
Payer: COMMERCIAL

## 2022-03-02 DIAGNOSIS — R06.00 DYSPNEA, UNSPECIFIED TYPE: Primary | ICD-10-CM

## 2025-06-02 ENCOUNTER — OFFICE VISIT (OUTPATIENT)
Dept: SURGERY | Facility: CLINIC | Age: 45
End: 2025-06-02
Payer: COMMERCIAL

## 2025-06-02 VITALS
WEIGHT: 194.75 LBS | HEART RATE: 74 BPM | SYSTOLIC BLOOD PRESSURE: 124 MMHG | DIASTOLIC BLOOD PRESSURE: 78 MMHG | BODY MASS INDEX: 27.95 KG/M2

## 2025-06-02 DIAGNOSIS — K40.90 LEFT INGUINAL HERNIA: Primary | ICD-10-CM

## 2025-06-02 PROCEDURE — 99999 PR PBB SHADOW E&M-NEW PATIENT-LVL IV: CPT | Mod: PBBFAC,,, | Performed by: SURGERY

## 2025-06-02 PROCEDURE — 1160F RVW MEDS BY RX/DR IN RCRD: CPT | Mod: CPTII,S$GLB,, | Performed by: SURGERY

## 2025-06-02 PROCEDURE — 1159F MED LIST DOCD IN RCRD: CPT | Mod: CPTII,S$GLB,, | Performed by: SURGERY

## 2025-06-02 PROCEDURE — 3078F DIAST BP <80 MM HG: CPT | Mod: CPTII,S$GLB,, | Performed by: SURGERY

## 2025-06-02 PROCEDURE — 99203 OFFICE O/P NEW LOW 30 MIN: CPT | Mod: S$GLB,,, | Performed by: SURGERY

## 2025-06-02 PROCEDURE — 3074F SYST BP LT 130 MM HG: CPT | Mod: CPTII,S$GLB,, | Performed by: SURGERY

## 2025-06-02 PROCEDURE — 3008F BODY MASS INDEX DOCD: CPT | Mod: CPTII,S$GLB,, | Performed by: SURGERY

## 2025-06-02 NOTE — PROGRESS NOTES
History & Physical    Subjective     History of Present Illness:  Patient is a 44 y.o. male presents with concern of a bulge in his left inguinal area consistent with a inguinal hernia.    Patient has noticed protruding out more especially with straining.    Is able to push it back in however does have some tenderness occasionally.    Discussed with him surgical intervention with robotic inguinal hernia repair with mesh placement.    Patient agrees to this plan.        Chief Complaint   Patient presents with    Hernia       Review of patient's allergies indicates:  No Known Allergies    Current Medications[1]    Past Medical History:   Diagnosis Date    Asthma     Discitis of lumbosacral region     Epidural abscess     MRSA bacteremia 07/2020     Past Surgical History:   Procedure Laterality Date    BACK SURGERY      REPAIR OF TORSION OF TESTICLE Right     child     No family history on file.  Social History[2]     Review of Systems:  Review of Systems   Constitutional:  Negative for appetite change, fatigue, fever and unexpected weight change.   HENT:  Negative for sore throat and trouble swallowing.    Eyes: Negative.    Respiratory:  Negative for cough, shortness of breath and wheezing.    Cardiovascular:  Negative for chest pain and leg swelling.   Gastrointestinal:  Positive for abdominal pain. Negative for abdominal distention, blood in stool, constipation, diarrhea, nausea and vomiting.   Endocrine: Negative.    Genitourinary: Negative.    Musculoskeletal:  Negative for back pain.   Skin: Negative.  Negative for rash.   Allergic/Immunologic: Negative.    Neurological: Negative.    Hematological: Negative.    Psychiatric/Behavioral:  Negative for confusion.         Objective     Vital Signs (Most Recent)  Pulse: 74 (06/02/25 1509)  BP: 124/78 (06/02/25 1509)     88.3 kg (194 lb 12.4 oz)     Physical Exam:  Physical Exam  Vitals and nursing note reviewed.   Constitutional:       Appearance: He is  well-developed.   HENT:      Head: Normocephalic and atraumatic.   Cardiovascular:      Rate and Rhythm: Normal rate.      Heart sounds: Normal heart sounds.   Pulmonary:      Effort: Pulmonary effort is normal.   Abdominal:      General: Bowel sounds are normal. There is no distension.      Palpations: Abdomen is soft.      Tenderness: There is no abdominal tenderness.      Hernia: A hernia is present.   Musculoskeletal:         General: Normal range of motion.      Cervical back: Normal range of motion.   Skin:     General: Skin is warm and dry.      Capillary Refill: Capillary refill takes less than 2 seconds.             Comments: Left inguinal hernia, reducible   Neurological:      Mental Status: He is alert and oriented to person, place, and time.   Psychiatric:         Behavior: Behavior normal.     Laboratory  CBC: Reviewed  CMP: Reviewed           Assessment and Plan   44-year-old male with a left inguinal hernia   Recommend surgical intervention with robotic left inguinal hernia repair with mesh placement.    All risks and benefits discussed              [1]   Current Outpatient Medications   Medication Sig Dispense Refill    albuterol (PROVENTIL/VENTOLIN HFA) 90 mcg/actuation inhaler Inhale into the lungs.      albuterol-ipratropium (DUO-NEB) 2.5 mg-0.5 mg/3 mL nebulizer solution Take by nebulization every 4 (four) hours as needed.      azithromycin (Z-BOY) 250 MG tablet Take by mouth.      budesonide (PULMICORT) 0.5 mg/2 mL nebulizer solution Take 2 mLs (0.5 mg total) by nebulization 2 (two) times daily. Controller 120 mL 5    dextroamphetamine-amphetamine 30 mg Tab Take 30 tablets by mouth 2 (two) times a day.       doxycycline (VIBRA-TABS) 100 MG tablet Take 100 mg by mouth 2 (two) times daily.      methylPREDNISolone (MEDROL DOSEPACK) 4 mg tablet Take by mouth.      ondansetron (ZOFRAN-ODT) 8 MG TbDL DIS ONE T PO  Q 8 H PRN      pregabalin (LYRICA) 75 MG capsule Take 1 capsule (75 mg total) by mouth 2  (two) times daily. 60 capsule 2    testosterone cypionate (DEPOTESTOTERONE CYPIONATE) 200 mg/mL injection SMARTSI.75 Milliliter(s) IM Once a Week      valACYclovir (VALTREX) 1000 MG tablet SMARTSI By Mouth Every 12 Hours PRN       No current facility-administered medications for this visit.   [2]   Social History  Tobacco Use    Smoking status: Never    Smokeless tobacco: Never   Substance Use Topics    Alcohol use: Yes     Comment: sometimes

## 2025-06-09 RX ORDER — SODIUM CHLORIDE 9 MG/ML
INJECTION, SOLUTION INTRAVENOUS CONTINUOUS
OUTPATIENT
Start: 2025-06-09

## 2025-06-09 RX ORDER — ENOXAPARIN SODIUM 100 MG/ML
40 INJECTION SUBCUTANEOUS
OUTPATIENT
Start: 2025-06-09